# Patient Record
Sex: MALE | Race: BLACK OR AFRICAN AMERICAN | Employment: OTHER | URBAN - METROPOLITAN AREA
[De-identification: names, ages, dates, MRNs, and addresses within clinical notes are randomized per-mention and may not be internally consistent; named-entity substitution may affect disease eponyms.]

---

## 2018-04-27 ENCOUNTER — OFFICE VISIT (OUTPATIENT)
Dept: RHEUMATOLOGY | Age: 58
End: 2018-04-27

## 2018-04-27 ENCOUNTER — TELEPHONE (OUTPATIENT)
Dept: DERMATOLOGY | Age: 58
End: 2018-04-27

## 2018-04-27 VITALS
HEART RATE: 74 BPM | DIASTOLIC BLOOD PRESSURE: 86 MMHG | BODY MASS INDEX: 26.13 KG/M2 | HEIGHT: 68 IN | WEIGHT: 172.4 LBS | SYSTOLIC BLOOD PRESSURE: 156 MMHG

## 2018-04-27 DIAGNOSIS — L30.9 DERMATITIS: Primary | ICD-10-CM

## 2018-04-27 DIAGNOSIS — L30.9 DERMATITIS: ICD-10-CM

## 2018-04-27 LAB
BASOPHILS ABSOLUTE: 0 K/UL (ref 0–0.2)
BASOPHILS RELATIVE PERCENT: 1.6 %
BILIRUBIN URINE: NEGATIVE
BLOOD, URINE: NEGATIVE
CLARITY: CLEAR
COLOR: YELLOW
CREAT SERPL-MCNC: 1.3 MG/DL (ref 0.9–1.3)
EOSINOPHILS ABSOLUTE: 0.1 K/UL (ref 0–0.6)
EOSINOPHILS RELATIVE PERCENT: 1.9 %
EPITHELIAL CELLS, UA: 0 /HPF (ref 0–5)
GFR AFRICAN AMERICAN: >60
GFR NON-AFRICAN AMERICAN: 57
GLUCOSE URINE: NEGATIVE MG/DL
HCT VFR BLD CALC: 30.3 % (ref 40.5–52.5)
HEMOGLOBIN: 10.3 G/DL (ref 13.5–17.5)
HYALINE CASTS: 3 /LPF (ref 0–8)
KETONES, URINE: NEGATIVE MG/DL
LEUKOCYTE ESTERASE, URINE: NEGATIVE
LYMPHOCYTES ABSOLUTE: 0.8 K/UL (ref 1–5.1)
LYMPHOCYTES RELATIVE PERCENT: 27.2 %
MCH RBC QN AUTO: 35.7 PG (ref 26–34)
MCHC RBC AUTO-ENTMCNC: 33.9 G/DL (ref 31–36)
MCV RBC AUTO: 105.1 FL (ref 80–100)
MICROSCOPIC EXAMINATION: YES
MONOCYTES ABSOLUTE: 0.5 K/UL (ref 0–1.3)
MONOCYTES RELATIVE PERCENT: 14.9 %
NEUTROPHILS ABSOLUTE: 1.7 K/UL (ref 1.7–7.7)
NEUTROPHILS RELATIVE PERCENT: 54.4 %
NITRITE, URINE: NEGATIVE
PDW BLD-RTO: 14.9 % (ref 12.4–15.4)
PH UA: 6
PLATELET # BLD: 165 K/UL (ref 135–450)
PMV BLD AUTO: 8.7 FL (ref 5–10.5)
PROTEIN UA: >=300 MG/DL
RBC # BLD: 2.89 M/UL (ref 4.2–5.9)
RBC UA: 4 /HPF (ref 0–4)
SPECIFIC GRAVITY UA: 1.02
URINE TYPE: ABNORMAL
UROBILINOGEN, URINE: 1 E.U./DL
WBC # BLD: 3.1 K/UL (ref 4–11)
WBC UA: 2 /HPF (ref 0–5)

## 2018-04-27 PROCEDURE — G8419 CALC BMI OUT NRM PARAM NOF/U: HCPCS | Performed by: INTERNAL MEDICINE

## 2018-04-27 PROCEDURE — G8427 DOCREV CUR MEDS BY ELIG CLIN: HCPCS | Performed by: INTERNAL MEDICINE

## 2018-04-27 PROCEDURE — 99204 OFFICE O/P NEW MOD 45 MIN: CPT | Performed by: INTERNAL MEDICINE

## 2018-04-27 PROCEDURE — 4004F PT TOBACCO SCREEN RCVD TLK: CPT | Performed by: INTERNAL MEDICINE

## 2018-04-27 PROCEDURE — 3017F COLORECTAL CA SCREEN DOC REV: CPT | Performed by: INTERNAL MEDICINE

## 2018-04-27 RX ORDER — PREDNISONE 20 MG/1
TABLET ORAL
Qty: 90 TABLET | Refills: 0 | Status: SHIPPED | OUTPATIENT
Start: 2018-04-27 | End: 2018-06-04 | Stop reason: SDUPTHER

## 2018-04-30 ENCOUNTER — TELEPHONE (OUTPATIENT)
Dept: RHEUMATOLOGY | Age: 58
End: 2018-04-30

## 2018-04-30 DIAGNOSIS — R80.9 PROTEINURIA, UNSPECIFIED TYPE: Primary | ICD-10-CM

## 2018-04-30 LAB
DOUBLE STRANDED DNA AB, IGG: DETECTED
ENA TO RNP ANTIBODY: POSITIVE EU
ENA TO SMITH (SM) ANTIBODY: POSITIVE EU
ENA TO SSA (RO) ANTIBODY: POSITIVE EU
ENA TO SSB (LA) ANTIBODY: NEGATIVE EU

## 2018-04-30 RX ORDER — HYDROXYCHLOROQUINE SULFATE 200 MG/1
400 TABLET, FILM COATED ORAL DAILY
Qty: 60 TABLET | Refills: 2 | Status: SHIPPED | OUTPATIENT
Start: 2018-04-30 | End: 2018-06-04 | Stop reason: SDUPTHER

## 2018-05-01 LAB — DSDNA ANTIBODY TITER: ABNORMAL

## 2018-05-02 LAB
ANA INTERPRETATION: ABNORMAL
ANA PATTERN: ABNORMAL
ANA TITER: ABNORMAL
ANTI-NUCLEAR ANTIBODY (ANA): POSITIVE
PATHOLOGIST: ABNORMAL

## 2018-05-03 LAB
24HR URINE VOLUME (ML): 3200 ML
CREATININE 24 HOUR URINE: 1.9 G/24HR (ref 0.6–2.5)
PROTEIN 24 HOUR URINE: 5.18 G/24HR

## 2018-05-22 ENCOUNTER — TELEPHONE (OUTPATIENT)
Dept: INTERNAL MEDICINE CLINIC | Age: 58
End: 2018-05-22

## 2018-06-04 ENCOUNTER — OFFICE VISIT (OUTPATIENT)
Dept: RHEUMATOLOGY | Age: 58
End: 2018-06-04

## 2018-06-04 VITALS
DIASTOLIC BLOOD PRESSURE: 80 MMHG | WEIGHT: 165 LBS | HEIGHT: 68 IN | HEART RATE: 80 BPM | SYSTOLIC BLOOD PRESSURE: 152 MMHG | BODY MASS INDEX: 25.01 KG/M2

## 2018-06-04 DIAGNOSIS — M32.14 OTHER SYSTEMIC LUPUS ERYTHEMATOSUS WITH GLOMERULAR DISEASE (HCC): Primary | ICD-10-CM

## 2018-06-04 PROCEDURE — 99213 OFFICE O/P EST LOW 20 MIN: CPT | Performed by: INTERNAL MEDICINE

## 2018-06-04 PROCEDURE — 4004F PT TOBACCO SCREEN RCVD TLK: CPT | Performed by: INTERNAL MEDICINE

## 2018-06-04 PROCEDURE — G8419 CALC BMI OUT NRM PARAM NOF/U: HCPCS | Performed by: INTERNAL MEDICINE

## 2018-06-04 PROCEDURE — 3017F COLORECTAL CA SCREEN DOC REV: CPT | Performed by: INTERNAL MEDICINE

## 2018-06-04 PROCEDURE — G8427 DOCREV CUR MEDS BY ELIG CLIN: HCPCS | Performed by: INTERNAL MEDICINE

## 2018-06-04 RX ORDER — TORSEMIDE 20 MG/1
20 TABLET ORAL DAILY
COMMUNITY
End: 2019-01-31 | Stop reason: ALTCHOICE

## 2018-06-04 RX ORDER — PEN NEEDLE, DIABETIC 31 GX5/16"
1 NEEDLE, DISPOSABLE MISCELLANEOUS DAILY
COMMUNITY
End: 2020-05-29 | Stop reason: SDUPTHER

## 2018-06-04 RX ORDER — HYDROXYCHLOROQUINE SULFATE 200 MG/1
400 TABLET, FILM COATED ORAL DAILY
Qty: 60 TABLET | Refills: 2 | Status: SHIPPED | OUTPATIENT
Start: 2018-06-04 | End: 2018-09-25 | Stop reason: SDUPTHER

## 2018-06-04 RX ORDER — PREDNISONE 20 MG/1
20 TABLET ORAL 2 TIMES DAILY
Qty: 60 TABLET | Refills: 2 | Status: SHIPPED | OUTPATIENT
Start: 2018-06-04 | End: 2019-09-26

## 2018-06-04 RX ORDER — LANCETS 30 GAUGE
1 EACH MISCELLANEOUS DAILY
COMMUNITY
End: 2022-05-09

## 2018-06-04 RX ORDER — CARVEDILOL 12.5 MG/1
12.5 TABLET ORAL 2 TIMES DAILY WITH MEALS
COMMUNITY
End: 2018-07-12 | Stop reason: SDUPTHER

## 2018-06-04 RX ORDER — INSULIN GLARGINE 100 [IU]/ML
24 INJECTION, SOLUTION SUBCUTANEOUS EVERY MORNING
COMMUNITY
End: 2019-05-08

## 2018-06-15 ENCOUNTER — OFFICE VISIT (OUTPATIENT)
Dept: INTERNAL MEDICINE CLINIC | Age: 58
End: 2018-06-15

## 2018-06-15 VITALS
BODY MASS INDEX: 25.31 KG/M2 | HEART RATE: 80 BPM | DIASTOLIC BLOOD PRESSURE: 80 MMHG | HEIGHT: 68 IN | SYSTOLIC BLOOD PRESSURE: 142 MMHG | WEIGHT: 167 LBS

## 2018-06-15 DIAGNOSIS — E09.9 STEROID-INDUCED DIABETES (HCC): ICD-10-CM

## 2018-06-15 DIAGNOSIS — I42.0 DILATED CARDIOMYOPATHY (HCC): ICD-10-CM

## 2018-06-15 DIAGNOSIS — R80.9 NEPHROTIC RANGE PROTEINURIA: ICD-10-CM

## 2018-06-15 DIAGNOSIS — E11.65 TYPE 2 DIABETES MELLITUS WITH HYPERGLYCEMIA, WITH LONG-TERM CURRENT USE OF INSULIN (HCC): ICD-10-CM

## 2018-06-15 DIAGNOSIS — L80 VITILIGO: ICD-10-CM

## 2018-06-15 DIAGNOSIS — Z79.4 TYPE 2 DIABETES MELLITUS WITH HYPERGLYCEMIA, WITH LONG-TERM CURRENT USE OF INSULIN (HCC): ICD-10-CM

## 2018-06-15 DIAGNOSIS — Z12.5 PROSTATE CANCER SCREENING: Primary | ICD-10-CM

## 2018-06-15 DIAGNOSIS — M32.14 LUPUS NEPHRITIS (HCC): ICD-10-CM

## 2018-06-15 DIAGNOSIS — M32.19 DILATED CARDIOMYOPATHY SECONDARY TO SYSTEMIC LUPUS ERYTHEMATOSUS (HCC): ICD-10-CM

## 2018-06-15 DIAGNOSIS — T38.0X5A STEROID-INDUCED DIABETES (HCC): ICD-10-CM

## 2018-06-15 DIAGNOSIS — I43 DILATED CARDIOMYOPATHY SECONDARY TO SYSTEMIC LUPUS ERYTHEMATOSUS (HCC): ICD-10-CM

## 2018-06-15 DIAGNOSIS — I15.9 SECONDARY HYPERTENSION: ICD-10-CM

## 2018-06-15 PROBLEM — I42.9 CARDIOMYOPATHY (HCC): Status: ACTIVE | Noted: 2018-01-01

## 2018-06-15 PROCEDURE — G8427 DOCREV CUR MEDS BY ELIG CLIN: HCPCS | Performed by: INTERNAL MEDICINE

## 2018-06-15 PROCEDURE — 99205 OFFICE O/P NEW HI 60 MIN: CPT | Performed by: INTERNAL MEDICINE

## 2018-06-15 PROCEDURE — 3017F COLORECTAL CA SCREEN DOC REV: CPT | Performed by: INTERNAL MEDICINE

## 2018-06-15 PROCEDURE — 3046F HEMOGLOBIN A1C LEVEL >9.0%: CPT | Performed by: INTERNAL MEDICINE

## 2018-06-15 PROCEDURE — 82962 GLUCOSE BLOOD TEST: CPT | Performed by: INTERNAL MEDICINE

## 2018-06-15 PROCEDURE — 2022F DILAT RTA XM EVC RTNOPTHY: CPT | Performed by: INTERNAL MEDICINE

## 2018-06-15 PROCEDURE — G8419 CALC BMI OUT NRM PARAM NOF/U: HCPCS | Performed by: INTERNAL MEDICINE

## 2018-06-15 PROCEDURE — 1036F TOBACCO NON-USER: CPT | Performed by: INTERNAL MEDICINE

## 2018-06-15 RX ORDER — LOSARTAN POTASSIUM 100 MG/1
50 TABLET ORAL DAILY
COMMUNITY
Start: 2018-05-11 | End: 2018-07-18 | Stop reason: ALTCHOICE

## 2018-06-15 ASSESSMENT — PATIENT HEALTH QUESTIONNAIRE - PHQ9
2. FEELING DOWN, DEPRESSED OR HOPELESS: 0
SUM OF ALL RESPONSES TO PHQ9 QUESTIONS 1 & 2: 0
1. LITTLE INTEREST OR PLEASURE IN DOING THINGS: 0
SUM OF ALL RESPONSES TO PHQ QUESTIONS 1-9: 0

## 2018-06-15 ASSESSMENT — ENCOUNTER SYMPTOMS
DIARRHEA: 0
NAUSEA: 0
VOMITING: 0
ORTHOPNEA: 0
BLOOD IN STOOL: 0
ABDOMINAL PAIN: 0
SHORTNESS OF BREATH: 0
CONSTIPATION: 0
WHEEZING: 0
HEARTBURN: 0
COUGH: 0

## 2018-07-12 ENCOUNTER — OFFICE VISIT (OUTPATIENT)
Dept: CARDIOLOGY CLINIC | Age: 58
End: 2018-07-12

## 2018-07-12 VITALS
WEIGHT: 182.1 LBS | DIASTOLIC BLOOD PRESSURE: 96 MMHG | HEART RATE: 71 BPM | SYSTOLIC BLOOD PRESSURE: 164 MMHG | BODY MASS INDEX: 28.1 KG/M2

## 2018-07-12 DIAGNOSIS — R06.02 SOB (SHORTNESS OF BREATH): Primary | ICD-10-CM

## 2018-07-12 DIAGNOSIS — I42.9 CARDIOMYOPATHY, UNSPECIFIED TYPE (HCC): ICD-10-CM

## 2018-07-12 PROCEDURE — 3017F COLORECTAL CA SCREEN DOC REV: CPT | Performed by: INTERNAL MEDICINE

## 2018-07-12 PROCEDURE — 99204 OFFICE O/P NEW MOD 45 MIN: CPT | Performed by: INTERNAL MEDICINE

## 2018-07-12 PROCEDURE — G8419 CALC BMI OUT NRM PARAM NOF/U: HCPCS | Performed by: INTERNAL MEDICINE

## 2018-07-12 PROCEDURE — G8427 DOCREV CUR MEDS BY ELIG CLIN: HCPCS | Performed by: INTERNAL MEDICINE

## 2018-07-12 RX ORDER — MYCOPHENOLATE MOFETIL 500 MG/1
500 TABLET ORAL 2 TIMES DAILY
COMMUNITY
End: 2019-07-22 | Stop reason: SDUPTHER

## 2018-07-12 RX ORDER — SULFAMETHOXAZOLE AND TRIMETHOPRIM 800; 160 MG/1; MG/1
1 TABLET ORAL ONCE
COMMUNITY
End: 2019-03-25

## 2018-07-12 RX ORDER — CARVEDILOL 12.5 MG/1
12.5 TABLET ORAL 2 TIMES DAILY WITH MEALS
Qty: 60 TABLET | Refills: 1 | Status: SHIPPED | OUTPATIENT
Start: 2018-07-12 | End: 2019-01-31 | Stop reason: ALTCHOICE

## 2018-07-12 RX ORDER — CALCIUM CARBONATE 500(1250)
500 TABLET ORAL 3 TIMES DAILY
COMMUNITY

## 2018-07-12 RX ORDER — OMEPRAZOLE 20 MG/1
20 CAPSULE, DELAYED RELEASE ORAL DAILY
COMMUNITY
End: 2019-05-08 | Stop reason: ALTCHOICE

## 2018-07-12 NOTE — PROGRESS NOTES
[facility] [place] [id number]     Outpatient Cardiology Consult  Consulting Cardiologist García Kingsley M.D., [facility] [place]  Referring Provider:  Frida Abdullahi MD    7/12/2018,9:58 AM    Chief Complaint   Patient presents with    New Patient    Results     hospital encounter 5/2018, Pt. with history of syncope, SOB with exercise.  Shortness of Breath           Asked by No admitting provider for patient encounter./Ramesh Mo MD  to evaluate and assess this patient's Cardiomyopathy    History of Present Illness: Lolly Magaña is a 61 y.o. male here for cardiac evaluation and establishment of cardiac care. He was recently diagnosed with significant cutaneous problems as having lupus and is seeing Dr. Ever Navarrete for rheumatology. Also sees Dr. Kelly Stephenson as his primary care doctor. Apparently while visiting at a hospital in [place] he did have workup including an echocardiogram showed cardiomyopathy with ejection fraction of 20%. Additionally showed moderate to severe mitral valve insufficiency. He denies chest pains denies shortness of breath and actually has surprisingly good energy. He golfs and has no limitations in fact walking 9 holes yesterday. Denies any PND orthopnea or pedal edema. Socially he did smoke but quit about 3 months ago. He did recently retired as up [employer]. Apparently losartan at been used for blood pressure and did cause him to have edema. He is on chronic dose of prednisone at 20 mg for his lupus. His examination today is unremarkable except for blood pressure 164/96. The heart rate is 71 and he does not have any peripheral edema and the lungs are clear. Past Medical History:   has a past medical history of Cardiomyopathy ([address] Dilated cardiomyopathy secondary to systemic lupus erythematosus ([address] Lupus nephritis ([address] Nephrotic range proteinuria; Secondary hypertension; Steroid-induced diabetes ([address]  Systemic lupus ([address] Type 2 diabetes mellitus ([address] and Vitiligo. Surgical History:   has no past surgical history on file. Social History:   reports that he has quit smoking. His smoking use included Cigarettes. He has a 1.00 pack-year smoking history. He has never used smokeless tobacco. He reports that he does not drink alcohol or use drugs. Family History:  family history is not on file. Home Medications:  Prior to Admission medications    Medication Sig Start Date End Date Taking?  Authorizing Provider   omeprazole (PRILOSEC) 20 MG delayed release capsule Take 20 mg by mouth daily   Yes Historical Provider, MD   sulfamethoxazole-trimethoprim (BACTRIM DS;SEPTRA DS) 800-160 MG per tablet Take 1 tablet by mouth once   Yes Historical Provider, MD   mycophenolate (CELLCEPT) 500 MG tablet Take 500 mg by mouth 2 times daily   Yes Historical Provider, MD   calcium elemental (OYSCO 500) 500 MG TABS tablet Take 500 mg by mouth 3 times daily   Yes Historical Provider, MD   carvedilol (COREG) 12.5 MG tablet Take 12.5 mg by mouth 2 times daily (with meals)   Yes Historical Provider, MD   hydroxychloroquine (PLAQUENIL) 200 MG tablet Take 2 tablets by mouth daily 6/4/18  Yes Sofy Vargas MD   predniSONE (DELTASONE) 20 MG tablet Take 1 tablet by mouth 2 times daily 6/4/18  Yes Sofy Vargas MD   torsemide (DEMADEX) 20 MG tablet Take 20 mg by mouth daily   Yes Historical Provider, MD   losartan (COZAAR) 100 MG tablet Take 50 mg by mouth daily 5/11/18   Historical Provider, MD   insulin glargine (LANTUS) 100 UNIT/ML injection vial Inject 24 Units into the skin every morning     Historical Provider, MD   Lancets MISC 1 each by Does not apply route daily    Historical Provider, MD   Insulin Pen Needle (PEN NEEDLES 31GX5/16\") 31G X 8 MM MISC 1 each by Does not apply route daily    Historical Provider, MD   insulin lispro (HUMALOG) 100 UNIT/ML injection cartridge Inject 8 Units into the skin 3 times daily (before meals) size.  There is a trivial pericardial effusion. This patient was educated using the patient point room wall mount device. Absence from smokers and smoking and diet and exercising are important. Assessment/ Plan      Systemic lupus erythematosus currently being treated with prednisone  Hypertension blood pressure 164/96  Cardiomyopathy of undetermined cause. May be related to lupus. Ejection fraction 20%  Mitral valve insufficiency which is moderate to severe without symptoms and may be due to left ventricular dilatation. At this time he seems to be fairly well compensated. No signs of failure. We had long discussion about sodium restriction. We will start him on entresto at 25 mg twice a day which will help his blood pressure as well as his LV function. We'll plan an ischemia evaluation although I don't think that is the case. We had a discussion about the potential need for a defibrillator and we will follow that very closely. He is due to have a dental extraction soon by Dr. Aaron Davis fax number is [phone] I do not see any contraindications for the extraction and will follow him back in the office in about 2 months and we will follow his echo to see if his LV function improves. Please fax a copy of this note to Dr. Aaron Davis as a cardiac clearance for the dental extraction that is anticipated. Thanks for allowing us the opportunity  to participate in the evaluation and care of your patients.  Please call if we may assist further [phone]    This note was likely completed using voice recognition technology and may contain unintended errors  Luke Orozco M.D., [facility] [place]  4/52/67093:16 AM

## 2018-07-18 ENCOUNTER — OFFICE VISIT (OUTPATIENT)
Dept: INTERNAL MEDICINE CLINIC | Age: 58
End: 2018-07-18

## 2018-07-18 VITALS
DIASTOLIC BLOOD PRESSURE: 98 MMHG | HEART RATE: 86 BPM | SYSTOLIC BLOOD PRESSURE: 180 MMHG | WEIGHT: 183 LBS | HEIGHT: 68 IN | BODY MASS INDEX: 27.74 KG/M2

## 2018-07-18 DIAGNOSIS — E11.65 TYPE 2 DIABETES MELLITUS WITH HYPERGLYCEMIA, WITH LONG-TERM CURRENT USE OF INSULIN (HCC): Primary | ICD-10-CM

## 2018-07-18 DIAGNOSIS — I15.9 SECONDARY HYPERTENSION: ICD-10-CM

## 2018-07-18 DIAGNOSIS — Z79.4 TYPE 2 DIABETES MELLITUS WITH HYPERGLYCEMIA, WITH LONG-TERM CURRENT USE OF INSULIN (HCC): Primary | ICD-10-CM

## 2018-07-18 PROCEDURE — 2022F DILAT RTA XM EVC RTNOPTHY: CPT | Performed by: INTERNAL MEDICINE

## 2018-07-18 PROCEDURE — 3046F HEMOGLOBIN A1C LEVEL >9.0%: CPT | Performed by: INTERNAL MEDICINE

## 2018-07-18 PROCEDURE — 99213 OFFICE O/P EST LOW 20 MIN: CPT | Performed by: INTERNAL MEDICINE

## 2018-07-18 PROCEDURE — 1036F TOBACCO NON-USER: CPT | Performed by: INTERNAL MEDICINE

## 2018-07-18 PROCEDURE — G8427 DOCREV CUR MEDS BY ELIG CLIN: HCPCS | Performed by: INTERNAL MEDICINE

## 2018-07-18 PROCEDURE — 3017F COLORECTAL CA SCREEN DOC REV: CPT | Performed by: INTERNAL MEDICINE

## 2018-07-18 PROCEDURE — G8419 CALC BMI OUT NRM PARAM NOF/U: HCPCS | Performed by: INTERNAL MEDICINE

## 2018-07-18 NOTE — PROGRESS NOTES
Disp: 60 tablet, Rfl: 1    sacubitril-valsartan (ENTRESTO) 24-26 MG per tablet, Take 1/2 tablet bid, Disp: 60 tablet, Rfl: 6    insulin glargine (LANTUS) 100 UNIT/ML injection vial, Inject 24 Units into the skin every morning , Disp: , Rfl:     hydroxychloroquine (PLAQUENIL) 200 MG tablet, Take 2 tablets by mouth daily, Disp: 60 tablet, Rfl: 2    predniSONE (DELTASONE) 20 MG tablet, Take 1 tablet by mouth 2 times daily, Disp: 60 tablet, Rfl: 2    Lancets MISC, 1 each by Does not apply route daily, Disp: , Rfl:     torsemide (DEMADEX) 20 MG tablet, Take 20 mg by mouth daily, Disp: , Rfl:     Insulin Pen Needle (PEN NEEDLES 31GX5/16\") 31G X 8 MM MISC, 1 each by Does not apply route daily, Disp: , Rfl:     insulin lispro (HUMALOG) 100 UNIT/ML injection cartridge, Inject 8 Units into the skin 3 times daily (before meals), Disp: , Rfl:     Assessment/Plan:  Dale Cabrera was seen today for 1 month follow-up. Diagnoses and all orders for this visit:    Type 2 diabetes mellitus with hyperglycemia, with long-term current use of insulin ([address]    Secondary hypertension      Patients last HbA1c in 5/2018 was 10.3. I will discuss his case with the cardiologist regarding his persistent elevated blood pressure and whether or not the Entresto is appropriately dosed. The patient is only taking it once a day. He only takes one half tablet daily.     Stephen Rosenthal MD

## 2018-07-19 ENCOUNTER — OFFICE VISIT (OUTPATIENT)
Dept: RHEUMATOLOGY | Age: 58
End: 2018-07-19

## 2018-07-19 VITALS
HEART RATE: 80 BPM | BODY MASS INDEX: 28.27 KG/M2 | DIASTOLIC BLOOD PRESSURE: 86 MMHG | SYSTOLIC BLOOD PRESSURE: 160 MMHG | WEIGHT: 183.2 LBS

## 2018-07-19 DIAGNOSIS — M32.14 OTHER SYSTEMIC LUPUS ERYTHEMATOSUS WITH GLOMERULAR DISEASE (HCC): Primary | ICD-10-CM

## 2018-07-19 DIAGNOSIS — M32.14 LUPUS NEPHRITIS (HCC): ICD-10-CM

## 2018-07-19 PROCEDURE — G8427 DOCREV CUR MEDS BY ELIG CLIN: HCPCS | Performed by: INTERNAL MEDICINE

## 2018-07-19 PROCEDURE — 1036F TOBACCO NON-USER: CPT | Performed by: INTERNAL MEDICINE

## 2018-07-19 PROCEDURE — 3017F COLORECTAL CA SCREEN DOC REV: CPT | Performed by: INTERNAL MEDICINE

## 2018-07-19 PROCEDURE — 99214 OFFICE O/P EST MOD 30 MIN: CPT | Performed by: INTERNAL MEDICINE

## 2018-07-19 PROCEDURE — G8419 CALC BMI OUT NRM PARAM NOF/U: HCPCS | Performed by: INTERNAL MEDICINE

## 2018-07-20 LAB
C3 COMPLEMENT: 90 MG/DL (ref 87–200)
C4 COMPLEMENT: 15 MG/DL (ref 19–52)

## 2018-08-07 ENCOUNTER — TELEPHONE (OUTPATIENT)
Dept: INTERNAL MEDICINE CLINIC | Age: 58
End: 2018-08-07

## 2018-09-25 RX ORDER — HYDROXYCHLOROQUINE SULFATE 200 MG/1
400 TABLET, FILM COATED ORAL DAILY
Qty: 60 TABLET | Refills: 1 | Status: SHIPPED | OUTPATIENT
Start: 2018-09-25 | End: 2018-11-27 | Stop reason: SDUPTHER

## 2018-09-27 ENCOUNTER — OFFICE VISIT (OUTPATIENT)
Dept: CARDIOLOGY CLINIC | Age: 58
End: 2018-09-27
Payer: COMMERCIAL

## 2018-09-27 VITALS
DIASTOLIC BLOOD PRESSURE: 80 MMHG | WEIGHT: 189.4 LBS | BODY MASS INDEX: 28.7 KG/M2 | SYSTOLIC BLOOD PRESSURE: 170 MMHG | OXYGEN SATURATION: 98 % | HEART RATE: 80 BPM | HEIGHT: 68 IN

## 2018-09-27 DIAGNOSIS — I42.9 CARDIOMYOPATHY, UNSPECIFIED TYPE (HCC): Primary | ICD-10-CM

## 2018-09-27 DIAGNOSIS — I10 ESSENTIAL HYPERTENSION: ICD-10-CM

## 2018-09-27 PROCEDURE — 3017F COLORECTAL CA SCREEN DOC REV: CPT | Performed by: INTERNAL MEDICINE

## 2018-09-27 PROCEDURE — G8427 DOCREV CUR MEDS BY ELIG CLIN: HCPCS | Performed by: INTERNAL MEDICINE

## 2018-09-27 PROCEDURE — G8419 CALC BMI OUT NRM PARAM NOF/U: HCPCS | Performed by: INTERNAL MEDICINE

## 2018-09-27 PROCEDURE — 1036F TOBACCO NON-USER: CPT | Performed by: INTERNAL MEDICINE

## 2018-09-27 PROCEDURE — 99214 OFFICE O/P EST MOD 30 MIN: CPT | Performed by: INTERNAL MEDICINE

## 2018-09-27 NOTE — PROGRESS NOTES
(with meals) 60 tablet 1    insulin glargine (LANTUS) 100 UNIT/ML injection vial Inject 24 Units into the skin every morning       predniSONE (DELTASONE) 20 MG tablet Take 1 tablet by mouth 2 times daily (Patient taking differently: Take 10 mg by mouth 2 times daily ) 60 tablet 2    Lancets MISC 1 each by Does not apply route daily      Insulin Pen Needle (PEN NEEDLES 31GX5/16\") 31G X 8 MM MISC 1 each by Does not apply route daily      insulin lispro (HUMALOG) 100 UNIT/ML injection cartridge Inject 8 Units into the skin 3 times daily (before meals)      sulfamethoxazole-trimethoprim (BACTRIM DS;SEPTRA DS) 800-160 MG per tablet Take 1 tablet by mouth once      sacubitril-valsartan (ENTRESTO) 24-26 MG per tablet Take 1/2 tablet bid 60 tablet 6    torsemide (DEMADEX) 20 MG tablet Take 20 mg by mouth daily       No current facility-administered medications for this visit. REVIEW OF SYSTEMS:    CONSTITUTIONAL: No major weight gain or loss, fatigue, weakness, night sweats or fever. HEENT: No new vision difficulties or ringing in the ears. RESPIRATORY: No new SOB, PND, orthopnea or cough. CARDIOVASCULAR: See HPI  GI: No nausea, vomiting, diarrhea, constipation, abdominal pain or changes in bowel habits. : No urinary frequency, urgency, incontinence hematuria or dysuria. SKIN: No cyanosis or skin lesions. MUSCULOSKELETAL: No new muscle or joint pain. NEUROLOGICAL: No syncope or TIA-like symptoms.   PSYCHIATRIC: No anxiety, pain, insomnia or depression    Objective:   PHYSICAL EXAM:        VITALS:    Wt Readings from Last 3 Encounters:   09/27/18 189 lb 6.4 oz (85.9 kg)   07/19/18 183 lb 3.2 oz (83.1 kg)   07/18/18 183 lb (83 kg)     BP Readings from Last 3 Encounters:   09/27/18 (!) 170/80   07/19/18 (!) 160/86   07/18/18 (!) 180/98     Pulse Readings from Last 3 Encounters:   09/27/18 80   07/19/18 80   07/18/18 86       CONSTITUTIONAL: Cooperative, no apparent distress, and appears well nourished / developed  NEUROLOGIC:  Awake and orientated to person, place and time. PSYCH: Calm affect. SKIN: Warm and dry. HEENT: Sclera non-icteric, normocephalic, neck supple, no elevation of JVP, normal carotid pulses with no bruits and thyroid normal size. LUNGS:  No increased work of breathing and clear to auscultation, no crackles or wheezing  CARDIOVASCULAR:  Regular rate and rhythm with no murmurs, gallops, rubs, or abnormal heart sounds, normal PMI. The apical impulses not displaced  Heart tones are crisp and normal  Cervical veins are not engorged  The carotid upstroke is normal in amplitude and contour without delay or bruit  JVP is not elevated  ABDOMEN:  Normal bowel sounds, non-distended and non-tender to palpation  EXT: No edema, no calf tenderness. Pulses are present bilaterally. DATA:    Lab Results   Component Value Date    ALT 54 (H) 10/05/2011    AST 41 (H) 10/05/2011    ALKPHOS 74 10/05/2011    BILITOT 0.50 10/05/2011     Lab Results   Component Value Date    CREATININE 1.58 (H) 09/09/2018    BUN 32 (H) 09/09/2018     (L) 09/09/2018    K 3.7 09/09/2018    CL 99 09/09/2018    CO2 25 09/09/2018     Lab Results   Component Value Date    TSH 1.45 10/05/2011    Y9WOGOW 6.3 10/05/2011     Lab Results   Component Value Date    WBC 7.2 09/09/2018    HGB 11.7 (L) 09/09/2018    HCT 33.7 (L) 09/09/2018    MCV 96.0 09/09/2018     09/09/2018     No components found for: CHLPL  Lab Results   Component Value Date    TRIG 229 (H) 10/05/2011     Lab Results   Component Value Date    HDL 42 10/05/2011     Lab Results   Component Value Date    LDLCALC 119 (H) 10/05/2011     Lab Results   Component Value Date    LABVLDL 46 10/05/2011     Radiology Review:  Pertinent images / reports were reviewed as a part of this visit and reveals the following:    Last Echo:  Study Conclusions July 26, 2018     - Left ventricle: The cavity size was normal. Wall thickness was    increased in a pattern of moderate LVH.

## 2018-10-22 ENCOUNTER — OFFICE VISIT (OUTPATIENT)
Dept: INTERNAL MEDICINE CLINIC | Age: 58
End: 2018-10-22
Payer: COMMERCIAL

## 2018-10-22 VITALS
HEART RATE: 76 BPM | DIASTOLIC BLOOD PRESSURE: 88 MMHG | HEIGHT: 68 IN | WEIGHT: 191 LBS | BODY MASS INDEX: 28.95 KG/M2 | SYSTOLIC BLOOD PRESSURE: 156 MMHG

## 2018-10-22 DIAGNOSIS — T38.0X5A STEROID-INDUCED DIABETES (HCC): ICD-10-CM

## 2018-10-22 DIAGNOSIS — Z23 NEED FOR PNEUMOCOCCAL VACCINATION: ICD-10-CM

## 2018-10-22 DIAGNOSIS — E09.9 STEROID-INDUCED DIABETES (HCC): ICD-10-CM

## 2018-10-22 DIAGNOSIS — Z23 NEED FOR INFLUENZA VACCINATION: Primary | ICD-10-CM

## 2018-10-22 DIAGNOSIS — I15.9 SECONDARY HYPERTENSION: ICD-10-CM

## 2018-10-22 DIAGNOSIS — Z23 NEED FOR TDAP VACCINATION: ICD-10-CM

## 2018-10-22 LAB
CHOLESTEROL, TOTAL: 198 MG/DL (ref 0–199)
HDLC SERPL-MCNC: 48 MG/DL (ref 40–60)
LDL CHOLESTEROL CALCULATED: 130 MG/DL
TRIGL SERPL-MCNC: 101 MG/DL (ref 0–150)
VLDLC SERPL CALC-MCNC: 20 MG/DL

## 2018-10-22 PROCEDURE — G8427 DOCREV CUR MEDS BY ELIG CLIN: HCPCS | Performed by: INTERNAL MEDICINE

## 2018-10-22 PROCEDURE — 1036F TOBACCO NON-USER: CPT | Performed by: INTERNAL MEDICINE

## 2018-10-22 PROCEDURE — 90715 TDAP VACCINE 7 YRS/> IM: CPT | Performed by: INTERNAL MEDICINE

## 2018-10-22 PROCEDURE — 90471 IMMUNIZATION ADMIN: CPT | Performed by: INTERNAL MEDICINE

## 2018-10-22 PROCEDURE — G8482 FLU IMMUNIZE ORDER/ADMIN: HCPCS | Performed by: INTERNAL MEDICINE

## 2018-10-22 PROCEDURE — 90732 PPSV23 VACC 2 YRS+ SUBQ/IM: CPT | Performed by: INTERNAL MEDICINE

## 2018-10-22 PROCEDURE — 90688 IIV4 VACCINE SPLT 0.5 ML IM: CPT | Performed by: INTERNAL MEDICINE

## 2018-10-22 PROCEDURE — G8419 CALC BMI OUT NRM PARAM NOF/U: HCPCS | Performed by: INTERNAL MEDICINE

## 2018-10-22 PROCEDURE — 90472 IMMUNIZATION ADMIN EACH ADD: CPT | Performed by: INTERNAL MEDICINE

## 2018-10-22 PROCEDURE — 3017F COLORECTAL CA SCREEN DOC REV: CPT | Performed by: INTERNAL MEDICINE

## 2018-10-22 PROCEDURE — 99213 OFFICE O/P EST LOW 20 MIN: CPT | Performed by: INTERNAL MEDICINE

## 2018-10-22 NOTE — PROGRESS NOTES
SUBJECTIVE:  Patient ID:Gianni Ambrocio is a 60 y.o. y.o. male     HPI    Roxie Ross returns for follow up of hypertension. Patient has been taking His medications as prescribed. Patient's blood pressureis  controlled. Side effects related to taking the medications include no medication side effects noted    Patient returns for follow up of hyperlipidemia. Patient has not been taking His medications as prescribed. Patient's lipids are controlled. Side effects related to taking the medications include none. He has had clinical consequences related to hyperlipidemia including coronary artery disease. The patient has been following up with cardiology for management of his blood pressure but his blood pressure remains elevated. There is some question as to whether or not the cardiologist was going to talk to the nephrologist about putting the patient on a higher dose of Entresto. I am not sure if that happened or not. Review of Systems    OBJECTIVE:    BP (!) 156/88   Pulse 76   Ht 5' 8\" (1.727 m)   Wt 191 lb (86.6 kg)   BMI 29.04 kg/m²    Physical Exam   Constitutional: He is oriented to person, place, and time. He appears well-developed and well-nourished. No distress. HENT:   Head: Normocephalic and atraumatic. Pulmonary/Chest: Effort normal.   Neurological: He is alert and oriented to person, place, and time. No cranial nerve deficit. Skin: He is not diaphoretic. Psychiatric: He has a normal mood and affect. His behavior is normal. Judgment and thought content normal.   Vitals reviewed.        Current Outpatient Prescriptions:     hydroxychloroquine (PLAQUENIL) 200 MG tablet, Take 2 tablets by mouth daily, Disp: 60 tablet, Rfl: 1    omeprazole (PRILOSEC) 20 MG delayed release capsule, Take 20 mg by mouth daily, Disp: , Rfl:     sulfamethoxazole-trimethoprim (BACTRIM DS;SEPTRA DS) 800-160 MG per tablet, Take 1 tablet by mouth once, Disp: , Rfl:     mycophenolate (CELLCEPT) 500 MG tablet, Take

## 2018-10-23 LAB
ESTIMATED AVERAGE GLUCOSE: 159.9 MG/DL
HBA1C MFR BLD: 7.2 %

## 2018-10-25 ENCOUNTER — OFFICE VISIT (OUTPATIENT)
Dept: RHEUMATOLOGY | Age: 58
End: 2018-10-25
Payer: COMMERCIAL

## 2018-10-25 VITALS
BODY MASS INDEX: 29.31 KG/M2 | WEIGHT: 193.4 LBS | DIASTOLIC BLOOD PRESSURE: 96 MMHG | SYSTOLIC BLOOD PRESSURE: 206 MMHG | HEART RATE: 86 BPM | HEIGHT: 68 IN

## 2018-10-25 DIAGNOSIS — M32.14 LUPUS NEPHRITIS (HCC): ICD-10-CM

## 2018-10-25 DIAGNOSIS — M32.14 LUPUS NEPHRITIS (HCC): Primary | ICD-10-CM

## 2018-10-25 LAB
CREATININE URINE: 102.4 MG/DL (ref 39–259)
PROTEIN PROTEIN: 291 MG/DL

## 2018-10-25 PROCEDURE — G8419 CALC BMI OUT NRM PARAM NOF/U: HCPCS | Performed by: INTERNAL MEDICINE

## 2018-10-25 PROCEDURE — G8428 CUR MEDS NOT DOCUMENT: HCPCS | Performed by: INTERNAL MEDICINE

## 2018-10-25 PROCEDURE — 3017F COLORECTAL CA SCREEN DOC REV: CPT | Performed by: INTERNAL MEDICINE

## 2018-10-25 PROCEDURE — G8482 FLU IMMUNIZE ORDER/ADMIN: HCPCS | Performed by: INTERNAL MEDICINE

## 2018-10-25 PROCEDURE — 1036F TOBACCO NON-USER: CPT | Performed by: INTERNAL MEDICINE

## 2018-10-25 PROCEDURE — 99213 OFFICE O/P EST LOW 20 MIN: CPT | Performed by: INTERNAL MEDICINE

## 2018-10-26 LAB
C3 COMPLEMENT: 103.2 MG/DL (ref 90–180)
C4 COMPLEMENT: 18.5 MG/DL (ref 10–40)

## 2018-10-27 LAB — DOUBLE STRANDED DNA AB, IGG: NORMAL

## 2018-11-27 RX ORDER — HYDROXYCHLOROQUINE SULFATE 200 MG/1
400 TABLET, FILM COATED ORAL DAILY
Qty: 180 TABLET | Refills: 0 | Status: SHIPPED | OUTPATIENT
Start: 2018-11-27 | End: 2019-01-31 | Stop reason: SDUPTHER

## 2019-01-31 ENCOUNTER — OFFICE VISIT (OUTPATIENT)
Dept: RHEUMATOLOGY | Age: 59
End: 2019-01-31
Payer: COMMERCIAL

## 2019-01-31 VITALS
HEART RATE: 82 BPM | WEIGHT: 203 LBS | BODY MASS INDEX: 30.87 KG/M2 | DIASTOLIC BLOOD PRESSURE: 88 MMHG | SYSTOLIC BLOOD PRESSURE: 146 MMHG

## 2019-01-31 DIAGNOSIS — M32.14 SYSTEMIC LUPUS ERYTHEMATOSUS WITH GLOMERULAR DISEASE, UNSPECIFIED SLE TYPE (HCC): Primary | ICD-10-CM

## 2019-01-31 DIAGNOSIS — Z79.899 ENCOUNTER FOR LONG-TERM (CURRENT) USE OF MEDICATIONS: ICD-10-CM

## 2019-01-31 PROCEDURE — 99213 OFFICE O/P EST LOW 20 MIN: CPT | Performed by: INTERNAL MEDICINE

## 2019-01-31 PROCEDURE — 3017F COLORECTAL CA SCREEN DOC REV: CPT | Performed by: INTERNAL MEDICINE

## 2019-01-31 PROCEDURE — G8428 CUR MEDS NOT DOCUMENT: HCPCS | Performed by: INTERNAL MEDICINE

## 2019-01-31 PROCEDURE — G8417 CALC BMI ABV UP PARAM F/U: HCPCS | Performed by: INTERNAL MEDICINE

## 2019-01-31 PROCEDURE — G8482 FLU IMMUNIZE ORDER/ADMIN: HCPCS | Performed by: INTERNAL MEDICINE

## 2019-01-31 PROCEDURE — 1036F TOBACCO NON-USER: CPT | Performed by: INTERNAL MEDICINE

## 2019-01-31 RX ORDER — HYDROXYCHLOROQUINE SULFATE 200 MG/1
400 TABLET, FILM COATED ORAL DAILY
Qty: 180 TABLET | Refills: 0 | Status: SHIPPED | OUTPATIENT
Start: 2019-01-31 | End: 2019-05-23 | Stop reason: SDUPTHER

## 2019-02-04 ENCOUNTER — TELEPHONE (OUTPATIENT)
Dept: INTERNAL MEDICINE CLINIC | Age: 59
End: 2019-02-04

## 2019-02-04 LAB
BASOPHILS ABSOLUTE: 29 /UL (ref 0–200)
BASOPHILS RELATIVE PERCENT: 0.7 % (ref 0–1)
C3 COMPLEMENT: 111 MG/DL (ref 87–200)
C4 COMPLEMENT: 25 MG/DL (ref 19–52)
EOSINOPHILS ABSOLUTE: 21 /UL (ref 15–500)
EOSINOPHILS RELATIVE PERCENT: 0.5 % (ref 0–8)
LYMPHOCYTES ABSOLUTE: 1415 /UL (ref 850–3900)
LYMPHOCYTES RELATIVE PERCENT: 34.5 % (ref 15–45)
MONOCYTES ABSOLUTE: 685 /UL (ref 200–950)
MONOCYTES RELATIVE PERCENT: 16.7 % (ref 0–12)
NEUTROPHILS ABSOLUTE: 1952 /UL (ref 1500–7800)
NUCLEATED RED BLOOD CELLS: 0 /100 WBC (ref 0–0)
SEGMENTED NEUTROPHILS RELATIVE PERCENT: 47.6 % (ref 40–80)

## 2019-05-08 ENCOUNTER — OFFICE VISIT (OUTPATIENT)
Dept: INTERNAL MEDICINE CLINIC | Age: 59
End: 2019-05-08
Payer: COMMERCIAL

## 2019-05-08 VITALS
SYSTOLIC BLOOD PRESSURE: 146 MMHG | HEART RATE: 72 BPM | DIASTOLIC BLOOD PRESSURE: 88 MMHG | WEIGHT: 205 LBS | BODY MASS INDEX: 31.07 KG/M2 | HEIGHT: 68 IN

## 2019-05-08 DIAGNOSIS — M32.14 LUPUS NEPHRITIS (HCC): ICD-10-CM

## 2019-05-08 DIAGNOSIS — E09.9 STEROID-INDUCED DIABETES (HCC): Primary | ICD-10-CM

## 2019-05-08 DIAGNOSIS — I43 DILATED CARDIOMYOPATHY SECONDARY TO SYSTEMIC LUPUS ERYTHEMATOSUS (HCC): ICD-10-CM

## 2019-05-08 DIAGNOSIS — Z79.4 TYPE 2 DIABETES MELLITUS WITH HYPERGLYCEMIA, WITH LONG-TERM CURRENT USE OF INSULIN (HCC): ICD-10-CM

## 2019-05-08 DIAGNOSIS — M32.19 DILATED CARDIOMYOPATHY SECONDARY TO SYSTEMIC LUPUS ERYTHEMATOSUS (HCC): ICD-10-CM

## 2019-05-08 DIAGNOSIS — T38.0X5A STEROID-INDUCED DIABETES (HCC): Primary | ICD-10-CM

## 2019-05-08 DIAGNOSIS — E11.65 TYPE 2 DIABETES MELLITUS WITH HYPERGLYCEMIA, WITH LONG-TERM CURRENT USE OF INSULIN (HCC): ICD-10-CM

## 2019-05-08 DIAGNOSIS — I15.9 SECONDARY HYPERTENSION: ICD-10-CM

## 2019-05-08 DIAGNOSIS — Z12.11 SCREEN FOR COLON CANCER: ICD-10-CM

## 2019-05-08 PROCEDURE — 3017F COLORECTAL CA SCREEN DOC REV: CPT | Performed by: INTERNAL MEDICINE

## 2019-05-08 PROCEDURE — G8427 DOCREV CUR MEDS BY ELIG CLIN: HCPCS | Performed by: INTERNAL MEDICINE

## 2019-05-08 PROCEDURE — 99214 OFFICE O/P EST MOD 30 MIN: CPT | Performed by: INTERNAL MEDICINE

## 2019-05-08 PROCEDURE — G8417 CALC BMI ABV UP PARAM F/U: HCPCS | Performed by: INTERNAL MEDICINE

## 2019-05-08 PROCEDURE — 3046F HEMOGLOBIN A1C LEVEL >9.0%: CPT | Performed by: INTERNAL MEDICINE

## 2019-05-08 PROCEDURE — 2022F DILAT RTA XM EVC RTNOPTHY: CPT | Performed by: INTERNAL MEDICINE

## 2019-05-08 PROCEDURE — 1036F TOBACCO NON-USER: CPT | Performed by: INTERNAL MEDICINE

## 2019-05-08 ASSESSMENT — PATIENT HEALTH QUESTIONNAIRE - PHQ9
1. LITTLE INTEREST OR PLEASURE IN DOING THINGS: 0
SUM OF ALL RESPONSES TO PHQ9 QUESTIONS 1 & 2: 0
2. FEELING DOWN, DEPRESSED OR HOPELESS: 0
SUM OF ALL RESPONSES TO PHQ QUESTIONS 1-9: 0
SUM OF ALL RESPONSES TO PHQ QUESTIONS 1-9: 0

## 2019-05-08 NOTE — PROGRESS NOTES
(NORVASC) 5 MG tablet, Take 5 mg by mouth, Disp: , Rfl:     torsemide (DEMADEX) 20 MG tablet, Take 20 mg by mouth, Disp: , Rfl:     carvedilol (COREG) 12.5 MG tablet, Take 1 tablet by mouth 2 times daily, Disp: 60 tablet, Rfl: 3    hydroxychloroquine (PLAQUENIL) 200 MG tablet, Take 2 tablets by mouth daily, Disp: 180 tablet, Rfl: 0    mycophenolate (CELLCEPT) 500 MG tablet, Take 500 mg by mouth 2 times daily 2 tablets TID, Disp: , Rfl:     calcium elemental (OYSCO 500) 500 MG TABS tablet, Take 500 mg by mouth 3 times daily, Disp: , Rfl:     sacubitril-valsartan (ENTRESTO) 24-26 MG per tablet, Take 1/2 tablet bid (Patient taking differently: 0.5 tablets daily Take 1/2 tablet bid), Disp: 60 tablet, Rfl: 6    insulin glargine (LANTUS) 100 UNIT/ML injection vial, Inject 24 Units into the skin every morning , Disp: , Rfl:     predniSONE (DELTASONE) 20 MG tablet, Take 1 tablet by mouth 2 times daily (Patient taking differently: Take 5 mg by mouth daily Indications: Takes 1 tablet everyother day ), Disp: 60 tablet, Rfl: 2    Lancets MISC, 1 each by Does not apply route daily, Disp: , Rfl:     Insulin Pen Needle (PEN NEEDLES 31GX5/16\") 31G X 8 MM MISC, 1 each by Does not apply route daily, Disp: , Rfl:     insulin lispro (HUMALOG) 100 UNIT/ML injection cartridge, Inject 8 Units into the skin 3 times daily (before meals), Disp: , Rfl:     Assessment/Plan:  Nacho Gtz was seen today for hypertension and hyperlipidemia. Diagnoses and all orders for this visit:    Steroid-induced diabetes ([facility]  -     Discontinue: insulin glargine (BASAGLAR KWIKPEN) 100 UNIT/ML injection pen; Inject 30 Units into the skin nightly  -     insulin glargine (BASAGLAR KWIKPEN) 100 UNIT/ML injection pen;  Inject 30 Units into the skin nightly    Type 2 diabetes mellitus with hyperglycemia, with long-term current use of insulin ([facility])  -     Hemoglobin A1C; Future    Secondary hypertension  Comments:  Patient has multiple reasons for elevated blood pressure including lupus nephritis, diabetes and other medications including mycophenolate. Lupus nephritis [facility])  Comments:  Nephrology is following.     Dilated cardiomyopathy secondary to systemic lupus erythematosus ([facility]    Screen for colon cancer  -     COLONOSCOPY (Screening)  -     YEVGENIY - Emy Das MD, Gastroenterology, [facility]        Lab Results   Component Value Date    LABA1C 7.2 10/22/2018     Lab Results   Component Value Date    .9 10/22/2018        Derrell Cook MD

## 2019-05-09 DIAGNOSIS — Z79.4 TYPE 2 DIABETES MELLITUS WITH HYPERGLYCEMIA, WITH LONG-TERM CURRENT USE OF INSULIN (HCC): ICD-10-CM

## 2019-05-09 DIAGNOSIS — E11.65 TYPE 2 DIABETES MELLITUS WITH HYPERGLYCEMIA, WITH LONG-TERM CURRENT USE OF INSULIN (HCC): ICD-10-CM

## 2019-05-14 LAB
ESTIMATED AVERAGE GLUCOSE: 148.5 MG/DL
HBA1C MFR BLD: 6.8 %

## 2019-05-23 ENCOUNTER — OFFICE VISIT (OUTPATIENT)
Dept: RHEUMATOLOGY | Age: 59
End: 2019-05-23
Payer: COMMERCIAL

## 2019-05-23 VITALS
BODY MASS INDEX: 30.98 KG/M2 | SYSTOLIC BLOOD PRESSURE: 152 MMHG | HEART RATE: 70 BPM | WEIGHT: 204.4 LBS | DIASTOLIC BLOOD PRESSURE: 86 MMHG | HEIGHT: 68 IN

## 2019-05-23 DIAGNOSIS — M32.14 SYSTEMIC LUPUS ERYTHEMATOSUS WITH GLOMERULAR DISEASE, UNSPECIFIED SLE TYPE (HCC): Primary | ICD-10-CM

## 2019-05-23 PROCEDURE — 1036F TOBACCO NON-USER: CPT | Performed by: INTERNAL MEDICINE

## 2019-05-23 PROCEDURE — 99213 OFFICE O/P EST LOW 20 MIN: CPT | Performed by: INTERNAL MEDICINE

## 2019-05-23 PROCEDURE — G8427 DOCREV CUR MEDS BY ELIG CLIN: HCPCS | Performed by: INTERNAL MEDICINE

## 2019-05-23 PROCEDURE — 3017F COLORECTAL CA SCREEN DOC REV: CPT | Performed by: INTERNAL MEDICINE

## 2019-05-23 PROCEDURE — G8417 CALC BMI ABV UP PARAM F/U: HCPCS | Performed by: INTERNAL MEDICINE

## 2019-05-23 RX ORDER — HYDROXYCHLOROQUINE SULFATE 200 MG/1
400 TABLET, FILM COATED ORAL DAILY
Qty: 180 TABLET | Refills: 1 | Status: SHIPPED | OUTPATIENT
Start: 2019-05-23 | End: 2019-11-27 | Stop reason: SDUPTHER

## 2019-05-23 NOTE — PROGRESS NOTES
Subjective:      Patient ID: Beni Ortega is a 60 y.o. male. HPI  The patient for follow-up of systemic lupus. He's now on CellCept 1000 mg twice daily prednisone 5 mg every other day and Plaquenil 400 mg daily. Overall he feels well. Review of Systems  Denies pleuritic chest pain denies mucosal sores denies Raynaud's denies sicca symptoms  Objective:   Physical Exam BP (!) 170/92 (Site: Left Upper Arm, Position: Sitting, Cuff Size: Medium Adult)   Pulse 70   Ht 5' 8\" (1.727 m)   Wt 204 lb 6.4 oz (92.7 kg)   BMI 31.08 kg/m²   Skin healing lesions on scalp.   One plus soft tissue swelling right wrist.  Lungs clear cardiovascular exam normal sinus rhythm    Assessment:      Systemic lupus      Plan:      Blood work was given to the patient to obtain at his next visit with her nephrologist.  I'll see the patient back in 4 months time        Benton Min MD

## 2019-09-26 ENCOUNTER — OFFICE VISIT (OUTPATIENT)
Dept: RHEUMATOLOGY | Age: 59
End: 2019-09-26
Payer: COMMERCIAL

## 2019-09-26 VITALS
SYSTOLIC BLOOD PRESSURE: 146 MMHG | BODY MASS INDEX: 30.77 KG/M2 | HEIGHT: 68 IN | DIASTOLIC BLOOD PRESSURE: 88 MMHG | HEART RATE: 68 BPM | WEIGHT: 203 LBS

## 2019-09-26 DIAGNOSIS — M32.14 SYSTEMIC LUPUS ERYTHEMATOSUS WITH GLOMERULAR DISEASE, UNSPECIFIED SLE TYPE (HCC): Primary | ICD-10-CM

## 2019-09-26 PROCEDURE — G8417 CALC BMI ABV UP PARAM F/U: HCPCS | Performed by: INTERNAL MEDICINE

## 2019-09-26 PROCEDURE — 99213 OFFICE O/P EST LOW 20 MIN: CPT | Performed by: INTERNAL MEDICINE

## 2019-09-26 PROCEDURE — 1036F TOBACCO NON-USER: CPT | Performed by: INTERNAL MEDICINE

## 2019-09-26 PROCEDURE — G8427 DOCREV CUR MEDS BY ELIG CLIN: HCPCS | Performed by: INTERNAL MEDICINE

## 2019-09-26 PROCEDURE — 3017F COLORECTAL CA SCREEN DOC REV: CPT | Performed by: INTERNAL MEDICINE

## 2019-11-27 DIAGNOSIS — M32.14 SYSTEMIC LUPUS ERYTHEMATOSUS WITH GLOMERULAR DISEASE, UNSPECIFIED SLE TYPE (HCC): ICD-10-CM

## 2019-11-27 RX ORDER — HYDROXYCHLOROQUINE SULFATE 200 MG/1
400 TABLET, FILM COATED ORAL DAILY
Qty: 180 TABLET | Refills: 0 | Status: SHIPPED | OUTPATIENT
Start: 2019-11-27 | End: 2020-02-24 | Stop reason: SDUPTHER

## 2020-02-24 ENCOUNTER — OFFICE VISIT (OUTPATIENT)
Dept: RHEUMATOLOGY | Age: 60
End: 2020-02-24
Payer: COMMERCIAL

## 2020-02-24 VITALS
DIASTOLIC BLOOD PRESSURE: 84 MMHG | SYSTOLIC BLOOD PRESSURE: 134 MMHG | BODY MASS INDEX: 28.19 KG/M2 | HEIGHT: 68 IN | HEART RATE: 76 BPM | WEIGHT: 186 LBS

## 2020-02-24 LAB
C3 COMPLEMENT: 111.4 MG/DL (ref 90–180)
C4 COMPLEMENT: 26 MG/DL (ref 10–40)

## 2020-02-24 PROCEDURE — G8427 DOCREV CUR MEDS BY ELIG CLIN: HCPCS | Performed by: INTERNAL MEDICINE

## 2020-02-24 PROCEDURE — 99213 OFFICE O/P EST LOW 20 MIN: CPT | Performed by: INTERNAL MEDICINE

## 2020-02-24 PROCEDURE — 1036F TOBACCO NON-USER: CPT | Performed by: INTERNAL MEDICINE

## 2020-02-24 PROCEDURE — G8484 FLU IMMUNIZE NO ADMIN: HCPCS | Performed by: INTERNAL MEDICINE

## 2020-02-24 PROCEDURE — 3017F COLORECTAL CA SCREEN DOC REV: CPT | Performed by: INTERNAL MEDICINE

## 2020-02-24 PROCEDURE — G8417 CALC BMI ABV UP PARAM F/U: HCPCS | Performed by: INTERNAL MEDICINE

## 2020-02-24 RX ORDER — HYDROXYCHLOROQUINE SULFATE 200 MG/1
400 TABLET, FILM COATED ORAL DAILY
Qty: 180 TABLET | Refills: 1 | Status: SHIPPED | OUTPATIENT
Start: 2020-02-24 | End: 2020-08-31 | Stop reason: SDUPTHER

## 2020-02-25 LAB — ANTI-DSDNA IGG: <1 IU/ML (ref 0–9)

## 2020-04-16 PROBLEM — E87.5 HYPERKALEMIA: Status: ACTIVE | Noted: 2020-04-16

## 2020-05-15 ENCOUNTER — TELEPHONE (OUTPATIENT)
Dept: INTERNAL MEDICINE CLINIC | Age: 60
End: 2020-05-15

## 2020-05-15 NOTE — TELEPHONE ENCOUNTER
Patient called in requesting lab orders. What are the labs for: To check pt A1C levels    Does the patient need orders faxed  No      Does patient need a follow up phone call? Yes        Verified Phone Number: [phone]    Patient states that if a copy of his orders are placed in his mychart he is able to print them out and go to a location close to his home. He also stated that he wanted to have visit after he does his blood work. Please call back to confirm.

## 2020-05-29 ENCOUNTER — VIRTUAL VISIT (OUTPATIENT)
Dept: INTERNAL MEDICINE CLINIC | Age: 60
End: 2020-05-29
Payer: COMMERCIAL

## 2020-05-29 VITALS
SYSTOLIC BLOOD PRESSURE: 143 MMHG | HEART RATE: 69 BPM | HEIGHT: 68 IN | DIASTOLIC BLOOD PRESSURE: 80 MMHG | WEIGHT: 180 LBS | BODY MASS INDEX: 27.28 KG/M2

## 2020-05-29 PROCEDURE — 99213 OFFICE O/P EST LOW 20 MIN: CPT | Performed by: INTERNAL MEDICINE

## 2020-05-29 PROCEDURE — G8427 DOCREV CUR MEDS BY ELIG CLIN: HCPCS | Performed by: INTERNAL MEDICINE

## 2020-05-29 PROCEDURE — 3046F HEMOGLOBIN A1C LEVEL >9.0%: CPT | Performed by: INTERNAL MEDICINE

## 2020-05-29 PROCEDURE — 3017F COLORECTAL CA SCREEN DOC REV: CPT | Performed by: INTERNAL MEDICINE

## 2020-05-29 PROCEDURE — 2022F DILAT RTA XM EVC RTNOPTHY: CPT | Performed by: INTERNAL MEDICINE

## 2020-05-29 RX ORDER — INSULIN GLARGINE 100 [IU]/ML
INJECTION, SOLUTION SUBCUTANEOUS
Qty: 15 ML | Refills: 3 | Status: SHIPPED | OUTPATIENT
Start: 2020-05-29 | End: 2022-05-09

## 2020-05-29 RX ORDER — PEN NEEDLE, DIABETIC 31 GX5/16"
1 NEEDLE, DISPOSABLE MISCELLANEOUS DAILY
Qty: 100 EACH | Refills: 2 | Status: SHIPPED | OUTPATIENT
Start: 2020-05-29 | End: 2022-05-09

## 2020-05-29 ASSESSMENT — PATIENT HEALTH QUESTIONNAIRE - PHQ9
SUM OF ALL RESPONSES TO PHQ QUESTIONS 1-9: 0
2. FEELING DOWN, DEPRESSED OR HOPELESS: 0
SUM OF ALL RESPONSES TO PHQ QUESTIONS 1-9: 0
SUM OF ALL RESPONSES TO PHQ9 QUESTIONS 1 & 2: 0
1. LITTLE INTEREST OR PLEASURE IN DOING THINGS: 0

## 2020-05-29 NOTE — PROGRESS NOTES
Does not apply route daily, Disp: 100 each, Rfl: 2    blood glucose test strips (ACCU-CHEK CEDRIC) strip, 1 each by In Vitro route daily As needed. , Disp: 100 each, Rfl: 3    carvedilol (COREG) 12.5 MG tablet, TAKE 1 TABLET BY MOUTH TWO TIMES A DAY, Disp: 60 tablet, Rfl: 3    spironolactone (ALDACTONE) 25 MG tablet, Take 1 tablet by mouth daily, Disp: 30 tablet, Rfl: 3    amLODIPine (NORVASC) 5 MG tablet, TAKE 1 TABLET BY MOUTH TWO TIMES A DAY , Disp: 60 tablet, Rfl: 3    mycophenolate (CELLCEPT) 500 MG tablet, Two tabs twice a day, Disp: 120 tablet, Rfl: 3    hydroxychloroquine (PLAQUENIL) 200 MG tablet, Take 2 tablets by mouth daily, Disp: 180 tablet, Rfl: 1    calcium elemental (OYSCO 500) 500 MG TABS tablet, Take 500 mg by mouth 3 times daily, Disp: , Rfl:     Lancets MISC, 1 each by Does not apply route daily, Disp: , Rfl:     Assessment/Plan:  Rasheed Mccain was seen today for diabetes. Diagnoses and all orders for this visit:    Type 2 diabetes mellitus with hyperglycemia, with long-term current use of insulin (HCC)  -     Hemoglobin A1C; Future  -     Diabetic Foot Exam    Steroid-induced diabetes (HCC)  -     insulin glargine (BASAGLAR KWIKPEN) 100 UNIT/ML injection pen; inject 30 units into the skin nightly  -     Insulin Pen Needle (PEN NEEDLES 31GX5/16\") 31G X 8 MM MISC; 1 each by Does not apply route daily  -     blood glucose test strips (ACCU-CHEK CEDRIC) strip; 1 each by In Vitro route daily As needed. -     Hemoglobin A1C; Future    Lupus nephritis (HCC)  Comments:  Patient is seeing Dr. Gertrude Rubio    Nephrotic range proteinuria  Comments:  Patient is seeing Dr. Tracey Jones, unspecified type [facility])  Comments:  Chronic, controlled.        Lab Results   Component Value Date    LABA1C 6.8 05/13/2019     Lab Results   Component Value Date    .5 05/13/2019       Kaylie Alvarez MD

## 2020-06-10 PROBLEM — N18.30 CKD (CHRONIC KIDNEY DISEASE), STAGE III (HCC): Status: ACTIVE | Noted: 2020-06-10

## 2020-08-31 ENCOUNTER — VIRTUAL VISIT (OUTPATIENT)
Dept: RHEUMATOLOGY | Age: 60
End: 2020-08-31
Payer: COMMERCIAL

## 2020-08-31 PROCEDURE — G8420 CALC BMI NORM PARAMETERS: HCPCS | Performed by: INTERNAL MEDICINE

## 2020-08-31 PROCEDURE — 3017F COLORECTAL CA SCREEN DOC REV: CPT | Performed by: INTERNAL MEDICINE

## 2020-08-31 PROCEDURE — 1036F TOBACCO NON-USER: CPT | Performed by: INTERNAL MEDICINE

## 2020-08-31 PROCEDURE — G8427 DOCREV CUR MEDS BY ELIG CLIN: HCPCS | Performed by: INTERNAL MEDICINE

## 2020-08-31 PROCEDURE — 99213 OFFICE O/P EST LOW 20 MIN: CPT | Performed by: INTERNAL MEDICINE

## 2020-08-31 RX ORDER — HYDROXYCHLOROQUINE SULFATE 200 MG/1
300 TABLET, FILM COATED ORAL DAILY
Qty: 135 TABLET | Refills: 1 | Status: SHIPPED | OUTPATIENT
Start: 2020-08-31 | End: 2021-03-02 | Stop reason: SDUPTHER

## 2020-08-31 NOTE — PROGRESS NOTES
Eris Armijo is a 60 y.o. male being evaluated by a Virtual Visit (video visit) encounter to address concerns as mentioned above. A caregiver was present when appropriate. Due to this being a TeleHealth encounter (During TDCJZ-05 public health emergency), evaluation of the following organ systems was limited: Vitals/Constitutional/EENT/Resp/CV/GI//MS/Neuro/Skin/Heme-Lymph-Imm. Pursuant to the emergency declaration under the [address] [place] [address] and the [employer] Act, this Virtual Visit was conducted with patient's (and/or legal guardian's) consent, to reduce the patient's risk of exposure to COVID-19 and provide necessary medical care. The patient (and/or legal guardian) has also been advised to contact this office for worsening conditions or problems, and seek emergency medical treatment and/or call 911 if deemed necessary. Patient identification was verified at the start of the visit: Yes    Total time spent for this encounter: Not billed by time    Services were provided through a video synchronous discussion virtually to substitute for in-person clinic visit. Patient and provider were located at their individual homes. --Rachael Griffiths RN on 8/31/2020 at 11:31 AM    An electronic signature was used to authenticate this note.

## 2020-10-26 PROBLEM — N18.32 STAGE 3B CHRONIC KIDNEY DISEASE (HCC): Status: ACTIVE | Noted: 2020-06-10

## 2020-12-02 ENCOUNTER — OFFICE VISIT (OUTPATIENT)
Dept: INTERNAL MEDICINE CLINIC | Age: 60
End: 2020-12-02
Payer: COMMERCIAL

## 2020-12-02 VITALS
TEMPERATURE: 97.7 F | BODY MASS INDEX: 23.18 KG/M2 | SYSTOLIC BLOOD PRESSURE: 148 MMHG | OXYGEN SATURATION: 99 % | DIASTOLIC BLOOD PRESSURE: 80 MMHG | WEIGHT: 157 LBS | HEART RATE: 69 BPM

## 2020-12-02 PROCEDURE — 90471 IMMUNIZATION ADMIN: CPT | Performed by: INTERNAL MEDICINE

## 2020-12-02 PROCEDURE — 99214 OFFICE O/P EST MOD 30 MIN: CPT | Performed by: INTERNAL MEDICINE

## 2020-12-02 PROCEDURE — 90686 IIV4 VACC NO PRSV 0.5 ML IM: CPT | Performed by: INTERNAL MEDICINE

## 2020-12-02 PROCEDURE — 3017F COLORECTAL CA SCREEN DOC REV: CPT | Performed by: INTERNAL MEDICINE

## 2020-12-02 PROCEDURE — G8427 DOCREV CUR MEDS BY ELIG CLIN: HCPCS | Performed by: INTERNAL MEDICINE

## 2020-12-02 PROCEDURE — 1036F TOBACCO NON-USER: CPT | Performed by: INTERNAL MEDICINE

## 2020-12-02 PROCEDURE — G8482 FLU IMMUNIZE ORDER/ADMIN: HCPCS | Performed by: INTERNAL MEDICINE

## 2020-12-02 PROCEDURE — G8420 CALC BMI NORM PARAMETERS: HCPCS | Performed by: INTERNAL MEDICINE

## 2020-12-02 ASSESSMENT — PATIENT HEALTH QUESTIONNAIRE - PHQ9
SUM OF ALL RESPONSES TO PHQ9 QUESTIONS 1 & 2: 0
SUM OF ALL RESPONSES TO PHQ QUESTIONS 1-9: 0
1. LITTLE INTEREST OR PLEASURE IN DOING THINGS: 0
SUM OF ALL RESPONSES TO PHQ QUESTIONS 1-9: 0
2. FEELING DOWN, DEPRESSED OR HOPELESS: 0
SUM OF ALL RESPONSES TO PHQ QUESTIONS 1-9: 0

## 2020-12-02 ASSESSMENT — ENCOUNTER SYMPTOMS: SHORTNESS OF BREATH: 0

## 2020-12-02 NOTE — PROGRESS NOTES
SUBJECTIVE:  Patient ID: Lj Sarmiento is a 63 y.o. y.o. male     HPI    Lj Sarmiento returns for follow up of hypertension. Patient has been taking His medications as prescribed. Patient's blood pressure is  controlled. Side effects related to taking the medications include no medication side effects noted    Patient returns to the office for follow up of his diabetes. His last hemoglobin A1c was 6.3. He is compliant with his medications and diet. Patient has no symptoms related to his condition such as blurred vision, slurred speech, chest pain orshortness of breath. Review of Systems   Constitutional: Negative for unexpected weight change (Tremendous weight loss). Respiratory: Negative for shortness of breath. Cardiovascular: Negative for chest pain, palpitations and leg swelling. OBJECTIVE:    BP (!) 148/80   Pulse 69   Temp 97.7 °F (36.5 °C) (Infrared)   Wt 157 lb (71.2 kg)   SpO2 99%   BMI 23.18 kg/m²      Physical Exam  Vitals signs reviewed. Constitutional:       General: He is not in acute distress. Appearance: He is well-developed. He is not diaphoretic. HENT:      Head: Normocephalic and atraumatic. Cardiovascular:      Rate and Rhythm: Normal rate and regular rhythm. Pulses: Normal pulses. Heart sounds: Normal heart sounds. No murmur. No friction rub. No gallop. Pulmonary:      Effort: Pulmonary effort is normal. No respiratory distress. Breath sounds: Normal breath sounds. No stridor. No wheezing, rhonchi or rales. Chest:      Chest wall: No tenderness. Neurological:      Mental Status: He is alert and oriented to person, place, and time. Cranial Nerves: No cranial nerve deficit. Psychiatric:         Behavior: Behavior normal.         Thought Content:  Thought content normal.         Judgment: Judgment normal.          Current Outpatient Medications:     sodium zirconium cyclosilicate (LOKELMA) 10 g PACK oral suspension, Take 10 g by mouth three times a week, Disp: 30 packet, Rfl: 0    hydroxychloroquine (PLAQUENIL) 200 MG tablet, Take 1.5 tablets by mouth daily, Disp: 135 tablet, Rfl: 1    amLODIPine (NORVASC) 5 MG tablet, TAKE 1 TABLET BY MOUTH TWO TIMES A DAY, Disp: 180 tablet, Rfl: 2    spironolactone (ALDACTONE) 25 MG tablet, Take 1 tablet by mouth daily, Disp: 90 tablet, Rfl: 2    carvedilol (COREG) 12.5 MG tablet, TAKE 1 TABLET BY MOUTH TWO TIMES A DAY, Disp: 180 tablet, Rfl: 2    mycophenolate (CELLCEPT) 500 MG tablet, Two tabs twice a day, Disp: 360 tablet, Rfl: 1    sodium zirconium cyclosilicate (LOKELMA) 10 g PACK oral suspension, Take 10 g by mouth every other day, Disp: 15 packet, Rfl: 3    Insulin Pen Needle (PEN NEEDLES 31GX5/16\") 31G X 8 MM MISC, 1 each by Does not apply route daily, Disp: 100 each, Rfl: 2    blood glucose test strips (ACCU-CHEK CEDRIC) strip, 1 each by In Vitro route daily As needed. , Disp: 100 each, Rfl: 3    calcium elemental (OYSCO 500) 500 MG TABS tablet, Take 500 mg by mouth 3 times daily, Disp: , Rfl:     Lancets MISC, 1 each by Does not apply route daily, Disp: , Rfl:     insulin glargine (BASAGLAR KWIKPEN) 100 UNIT/ML injection pen, inject 30 units into the skin nightly (Patient not taking: Reported on 12/2/2020), Disp: 15 mL, Rfl: 3    Assessment/Plan:  Chun Martinez was seen today for diabetes and hypertension. Diagnoses and all orders for this visit:    Need for influenza vaccination  -     INFLUENZA, QUADV, 3 YRS AND OLDER, IM PF, PREFILL SYR OR SDV, 0.5ML (AFLURIA QUADV, PF)    Steroid-induced diabetes mellitus, sequela ([facility]  -     Hemoglobin A1C; Future  -     Microalbumin / Creatinine Urine Ratio; Future    Lupus nephritis [facility])  Comments:  Patient has been seeing rheumatology for management of this problem. It is stable at this time. Dilated cardiomyopathy secondary to systemic lupus erythematosus ([facility]  Comments:  Josiane Jones, controlled. Patient is seeing cardiology and rheumatology.       Lab Results   Component Value Date    LABA1C 6.3 (H) 06/01/2020     Lab Results   Component Value Date    .5 05/13/2019       Elif Rutherford MD

## 2021-01-04 LAB
AVERAGE GLUCOSE: NORMAL
HBA1C MFR BLD: 6.2 %

## 2021-03-02 ENCOUNTER — VIRTUAL VISIT (OUTPATIENT)
Dept: RHEUMATOLOGY | Age: 61
End: 2021-03-02
Payer: COMMERCIAL

## 2021-03-02 DIAGNOSIS — M32.14 SYSTEMIC LUPUS ERYTHEMATOSUS WITH GLOMERULAR DISEASE, UNSPECIFIED SLE TYPE (HCC): Primary | ICD-10-CM

## 2021-03-02 DIAGNOSIS — Z79.899 ENCOUNTER FOR LONG-TERM (CURRENT) USE OF HIGH-RISK MEDICATION: ICD-10-CM

## 2021-03-02 PROCEDURE — 3017F COLORECTAL CA SCREEN DOC REV: CPT | Performed by: INTERNAL MEDICINE

## 2021-03-02 PROCEDURE — 99213 OFFICE O/P EST LOW 20 MIN: CPT | Performed by: INTERNAL MEDICINE

## 2021-03-02 PROCEDURE — G8427 DOCREV CUR MEDS BY ELIG CLIN: HCPCS | Performed by: INTERNAL MEDICINE

## 2021-03-02 RX ORDER — HYDROXYCHLOROQUINE SULFATE 200 MG/1
300 TABLET, FILM COATED ORAL DAILY
Qty: 135 TABLET | Refills: 1 | Status: SHIPPED | OUTPATIENT
Start: 2021-03-02 | End: 2021-08-17 | Stop reason: SDUPTHER

## 2021-03-02 NOTE — PROGRESS NOTES
Rola Pham, was evaluated through a synchronous (real-time) audio-video encounter. The patient (or guardian if applicable) is aware that this is a billable service. Verbal consent to proceed has been obtained within the past 12 months. The visit was conducted pursuant to the emergency declaration under the [address] [place] [address], [address] [place] [address] authority and the [employer] General Act. Patient identification was verified, and a caregiver was present when appropriate. The patient was located in a state where the provider was credentialed to provide care. Total time spent for this encounter: Not billed by time    --Dexter Pena RN on 3/2/2021 at 9:03 AM    An electronic signature was used to authenticate this note.

## 2021-03-02 NOTE — PROGRESS NOTES
Subjective:       Mary Monk is a 63 y.o. male the patient returns for follow-up of systemic lupus erythematosus. He continues on Plaquenil 300 mg a day. He also is on CellCept 500 mg twice daily. He is no longer on prednisone      Review of Systems:    Denies alopecia denies mucosal sores denies sicca symptoms denies pleuritic chest pain denies Raynaud's denies abdominal pain denies joint swelling    Objective:     Alert male in no acute distress respirations within normal limits musculoskeletal exam no synovitis skin no rash    Assessment:      SLE volume the    Plan:      The patient will get blood work to monitor activity of disease. Previous labs from his nephrologist were reviewed. I will see the patient back in 6 months time.   Need for Covid immunization was discussed        Sreekanth Banda MD, 3/2/2021 8:46 AM

## 2021-03-08 LAB
C3 COMPLEMENT: 107 MG/DL (ref 87–200)
C4 COMPLEMENT: 33 MG/DL (ref 19–52)

## 2021-03-25 RX ORDER — ATORVASTATIN CALCIUM 20 MG/1
20 TABLET, FILM COATED ORAL DAILY
Qty: 30 TABLET | Refills: 5 | Status: SHIPPED | OUTPATIENT
Start: 2021-03-25 | End: 2021-09-27

## 2021-04-21 ENCOUNTER — HOSPITAL ENCOUNTER (OUTPATIENT)
Dept: NON INVASIVE DIAGNOSTICS | Age: 61
Discharge: HOME | End: 2021-04-21
Payer: COMMERCIAL

## 2021-04-21 PROCEDURE — 93788 AMBL BP MNTR W/SW A/R: CPT

## 2021-04-21 PROCEDURE — 93786 AMBL BP MNTR W/SW REC ONLY: CPT

## 2021-06-02 ENCOUNTER — OFFICE VISIT (OUTPATIENT)
Dept: INTERNAL MEDICINE CLINIC | Age: 61
End: 2021-06-02
Payer: COMMERCIAL

## 2021-06-02 VITALS
BODY MASS INDEX: 23.67 KG/M2 | WEIGHT: 158 LBS | OXYGEN SATURATION: 100 % | HEART RATE: 82 BPM | DIASTOLIC BLOOD PRESSURE: 80 MMHG | TEMPERATURE: 97 F | SYSTOLIC BLOOD PRESSURE: 150 MMHG

## 2021-06-02 DIAGNOSIS — M32.14 LUPUS NEPHRITIS (HCC): Primary | ICD-10-CM

## 2021-06-02 DIAGNOSIS — E09.9 STEROID-INDUCED DIABETES MELLITUS, INITIAL ENCOUNTER (HCC): ICD-10-CM

## 2021-06-02 DIAGNOSIS — M32.19 DILATED CARDIOMYOPATHY SECONDARY TO SYSTEMIC LUPUS ERYTHEMATOSUS (HCC): ICD-10-CM

## 2021-06-02 DIAGNOSIS — I43 DILATED CARDIOMYOPATHY SECONDARY TO SYSTEMIC LUPUS ERYTHEMATOSUS (HCC): ICD-10-CM

## 2021-06-02 DIAGNOSIS — I15.9 SECONDARY HYPERTENSION: ICD-10-CM

## 2021-06-02 DIAGNOSIS — T38.0X5A STEROID-INDUCED DIABETES MELLITUS, INITIAL ENCOUNTER (HCC): ICD-10-CM

## 2021-06-02 PROCEDURE — G8420 CALC BMI NORM PARAMETERS: HCPCS | Performed by: INTERNAL MEDICINE

## 2021-06-02 PROCEDURE — 1036F TOBACCO NON-USER: CPT | Performed by: INTERNAL MEDICINE

## 2021-06-02 PROCEDURE — 99213 OFFICE O/P EST LOW 20 MIN: CPT | Performed by: INTERNAL MEDICINE

## 2021-06-02 PROCEDURE — G8427 DOCREV CUR MEDS BY ELIG CLIN: HCPCS | Performed by: INTERNAL MEDICINE

## 2021-06-02 PROCEDURE — 3017F COLORECTAL CA SCREEN DOC REV: CPT | Performed by: INTERNAL MEDICINE

## 2021-06-02 SDOH — ECONOMIC STABILITY: FOOD INSECURITY: WITHIN THE PAST 12 MONTHS, YOU WORRIED THAT YOUR FOOD WOULD RUN OUT BEFORE YOU GOT MONEY TO BUY MORE.: NEVER TRUE

## 2021-06-02 SDOH — ECONOMIC STABILITY: FOOD INSECURITY: WITHIN THE PAST 12 MONTHS, THE FOOD YOU BOUGHT JUST DIDN'T LAST AND YOU DIDN'T HAVE MONEY TO GET MORE.: NEVER TRUE

## 2021-06-02 ASSESSMENT — PATIENT HEALTH QUESTIONNAIRE - PHQ9
2. FEELING DOWN, DEPRESSED OR HOPELESS: 0
SUM OF ALL RESPONSES TO PHQ QUESTIONS 1-9: 0
SUM OF ALL RESPONSES TO PHQ QUESTIONS 1-9: 0
1. LITTLE INTEREST OR PLEASURE IN DOING THINGS: 0
SUM OF ALL RESPONSES TO PHQ QUESTIONS 1-9: 0
SUM OF ALL RESPONSES TO PHQ9 QUESTIONS 1 & 2: 0

## 2021-06-02 ASSESSMENT — SOCIAL DETERMINANTS OF HEALTH (SDOH): HOW HARD IS IT FOR YOU TO PAY FOR THE VERY BASICS LIKE FOOD, HOUSING, MEDICAL CARE, AND HEATING?: NOT HARD AT ALL

## 2021-06-02 NOTE — PROGRESS NOTES
Chief Complaint   Patient presents with    Hypertension    Diabetes     Vitals:    06/02/21 0841   BP: (!) 150/80   Pulse:    Temp:    SpO2:      PHQ Scores 6/2/2021 12/2/2020 5/29/2020 5/8/2019 6/15/2018   PHQ2 Score 0 0 0 0 0   PHQ9 Score 0 0 0 0 0     Interpretation of Total Score Depression Severity: 1-4 = Minimal depression, 5-9 = Mild depression, 10-14 = Moderate depression, 15-19 = Moderately severe depression, 20-27 = Severe depression    Physical Exam  Vitals reviewed. Constitutional:       General: He is not in acute distress. Appearance: He is well-developed. He is not diaphoretic. HENT:      Head: Normocephalic and atraumatic. Pulmonary:      Effort: Pulmonary effort is normal.   Neurological:      Mental Status: He is alert and oriented to person, place, and time. Cranial Nerves: No cranial nerve deficit. Psychiatric:         Behavior: Behavior normal.         Thought Content: Thought content normal.         Judgment: Judgment normal.       Assessment/Plan:  Gaby Garcia was seen today for hypertension and diabetes. Diagnoses and all orders for this visit:    Lupus nephritis [facility])  Comments:  Rheumatology is following     Dilated cardiomyopathy secondary to systemic lupus erythematosus ([facility] [address]  Comments:  Managed by rheumatology. Secondary hypertension  Comments:  Uncontrolled. Apparently, his ambulatory numbers are better. He is going to bring in his monitor. Steroid-induced diabetes mellitus, initial encounter [facility])  Comments:  She reports to me that he is not currently taking any of his medications for management of his diabetes.   Orders:  -     Hemoglobin A1C; Future        MD Angus Moreno

## 2021-08-16 ENCOUNTER — PATIENT MESSAGE (OUTPATIENT)
Dept: RHEUMATOLOGY | Age: 61
End: 2021-08-16

## 2021-08-16 DIAGNOSIS — Z79.899 ENCOUNTER FOR LONG-TERM (CURRENT) USE OF HIGH-RISK MEDICATION: ICD-10-CM

## 2021-08-16 DIAGNOSIS — M32.14 SYSTEMIC LUPUS ERYTHEMATOSUS WITH GLOMERULAR DISEASE, UNSPECIFIED SLE TYPE (HCC): ICD-10-CM

## 2021-08-18 RX ORDER — HYDROXYCHLOROQUINE SULFATE 200 MG/1
300 TABLET, FILM COATED ORAL DAILY
Qty: 135 TABLET | Refills: 0 | Status: SHIPPED | OUTPATIENT
Start: 2021-08-18 | End: 2021-12-08 | Stop reason: SDUPTHER

## 2021-09-14 ENCOUNTER — OFFICE VISIT (OUTPATIENT)
Dept: RHEUMATOLOGY | Age: 61
End: 2021-09-14
Payer: COMMERCIAL

## 2021-09-14 VITALS
HEIGHT: 69 IN | BODY MASS INDEX: 22.66 KG/M2 | WEIGHT: 153 LBS | OXYGEN SATURATION: 98 % | DIASTOLIC BLOOD PRESSURE: 98 MMHG | SYSTOLIC BLOOD PRESSURE: 160 MMHG | HEART RATE: 86 BPM

## 2021-09-14 DIAGNOSIS — M32.14 SYSTEMIC LUPUS ERYTHEMATOSUS WITH GLOMERULAR DISEASE, UNSPECIFIED SLE TYPE (HCC): Primary | ICD-10-CM

## 2021-09-14 PROCEDURE — 3017F COLORECTAL CA SCREEN DOC REV: CPT | Performed by: INTERNAL MEDICINE

## 2021-09-14 PROCEDURE — G8420 CALC BMI NORM PARAMETERS: HCPCS | Performed by: INTERNAL MEDICINE

## 2021-09-14 PROCEDURE — G8427 DOCREV CUR MEDS BY ELIG CLIN: HCPCS | Performed by: INTERNAL MEDICINE

## 2021-09-14 PROCEDURE — 99213 OFFICE O/P EST LOW 20 MIN: CPT | Performed by: INTERNAL MEDICINE

## 2021-09-14 PROCEDURE — 1036F TOBACCO NON-USER: CPT | Performed by: INTERNAL MEDICINE

## 2021-09-14 NOTE — PROGRESS NOTES
Subjective:       Srinivas Graham is a 57 y.o. male the patient returns for follow-up of lupus. She is currently on CellCept 500 mg twice daily and Plaquenil 300 mg a day. He was reminded that he is due for an eye exam.  He can do all his ADLs he feels well      Review of Systems:      Denies photosensitivity denies sicca symptoms denies any sores denies Raynaud's denies abdominal pain denies nausea denies rash  Objective:     BP (!) 160/98 Comment: Pt indicates he has white coat syndrome  Pulse 86   Ht 5' 9\" (1.753 m)   Wt 153 lb (69.4 kg)   SpO2 98%   BMI 22.59 kg/m²   Alert male in no acute distress. Lungs clear to auscultation. Cardiovascular exam normal sinus rhythm musculoskeletal exam no synovitis skin no active rash    Assessment:      Systemic lupus erythematosus    Plan:      The patient will get blood work for his nephrologist in the near future this will be supplemented today double-stranded DNA antibody ordered today. I will see patient back in 6 months time.         Familia Carrion MD, MD, 9/14/2021 9:38 AM

## 2021-09-27 LAB
ALBUMIN SERPL-MCNC: 3.8 G/DL (ref 3.5–5.7)
ANION GAP SERPL CALCULATED.3IONS-SCNC: 13 MMOL/L (ref 3–16)
BILIRUBIN URINE: NEGATIVE
BUN BLDV-MCNC: 38 MG/DL (ref 7–25)
C3 COMPLEMENT: 105 MG/DL (ref 87–200)
C4 COMPLEMENT: 29 MG/DL (ref 19–52)
CALCIUM SERPL-MCNC: 10 MG/DL (ref 8.6–10.3)
CHLORIDE BLD-SCNC: 106 MMOL/L (ref 98–110)
CLARITY: ABNORMAL
CO2: 23 MMOL/L (ref 21–33)
COLOR: YELLOW
CREAT SERPL-MCNC: 1.96 MG/DL (ref 0.6–1.3)
CREATININE URINE: 130.7 MG/DL
ERYTHROCYTES URINE: NEGATIVE
GFR, ESTIMATED: 36 SEE NOTE.
GFR, ESTIMATED: 41 SEE NOTE.
GLUCOSE BLD-MCNC: 96 MG/DL (ref 70–100)
GLUCOSE URINE: NEGATIVE MG/DL
HCT VFR BLD CALC: 38 % (ref 38.5–50)
HEMOGLOBIN: 12.7 G/DL (ref 13.2–17.1)
HYALINE CASTS: 14 /LPF (ref 0–2)
KETONES, URINE: NEGATIVE MG/DL
LEUKOCYTE ESTERASE, URINE: NEGATIVE
LEUKOCYTES, UA: 1 /HPF (ref 0–5)
MAGNESIUM: 1.8 MG/DL (ref 1.5–2.5)
MCH RBC QN AUTO: 31.7 PG (ref 27–33)
MCHC RBC AUTO-ENTMCNC: 33.6 G/DL (ref 32–36)
MCV RBC AUTO: 94.5 FL (ref 80–100)
NITRITE, URINE: NEGATIVE
OSMOLALITY CALCULATION: 303 MOSM/KG (ref 278–305)
PDW BLD-RTO: 12.8 % (ref 11–15)
PHOSPHORUS: 4.4 MG/DL (ref 2.1–4.7)
PLATELET # BLD: 182 10E3/UL (ref 140–400)
PMV BLD AUTO: 9.2 FL (ref 7.5–11.5)
POC PH ARTERIAL: 5 (ref 5–8)
POTASSIUM SERPL-SCNC: 4.6 MMOL/L (ref 3.5–5.3)
PROTEIN UA: >=500 MG/DL
PROTEIN, URINE, RANDOM: 251 MG/DL
PROTEIN/CREAT RATIO URINE RAN: 1.92 RATIO
RBC # BLD: 4.02 10E6/UL (ref 4.2–5.8)
RBC UA: 1 /HPF (ref 0–3)
SODIUM BLD-SCNC: 142 MMOL/L (ref 133–146)
SPECIFIC GRAVITY UA: 1.01 (ref 1–1.03)
UROBILINOGEN, URINE: <2 MG/DL (ref 0.2–1.9)
WBC # BLD: 3.4 10E3/UL (ref 3.8–10.8)

## 2021-09-27 RX ORDER — ATORVASTATIN CALCIUM 20 MG/1
TABLET, FILM COATED ORAL
Qty: 30 TABLET | Refills: 2 | Status: SHIPPED | OUTPATIENT
Start: 2021-09-27 | End: 2021-12-22

## 2021-09-29 LAB — DSDNA ANTIBODY: <1 IU/ML (ref 0–9)

## 2021-12-03 ENCOUNTER — OFFICE VISIT (OUTPATIENT)
Dept: INTERNAL MEDICINE CLINIC | Age: 61
End: 2021-12-03
Payer: COMMERCIAL

## 2021-12-03 VITALS
HEART RATE: 71 BPM | OXYGEN SATURATION: 100 % | DIASTOLIC BLOOD PRESSURE: 74 MMHG | SYSTOLIC BLOOD PRESSURE: 134 MMHG | BODY MASS INDEX: 23.25 KG/M2 | HEIGHT: 69 IN | WEIGHT: 157 LBS | TEMPERATURE: 97.6 F

## 2021-12-03 DIAGNOSIS — Z79.4 TYPE 2 DIABETES MELLITUS WITH HYPERGLYCEMIA, WITH LONG-TERM CURRENT USE OF INSULIN (HCC): ICD-10-CM

## 2021-12-03 DIAGNOSIS — Z00.00 ROUTINE ADULT HEALTH MAINTENANCE: ICD-10-CM

## 2021-12-03 DIAGNOSIS — Z11.59 NEED FOR HEPATITIS C SCREENING TEST: ICD-10-CM

## 2021-12-03 DIAGNOSIS — N18.32 STAGE 3B CHRONIC KIDNEY DISEASE (HCC): ICD-10-CM

## 2021-12-03 DIAGNOSIS — M32.19 DILATED CARDIOMYOPATHY SECONDARY TO SYSTEMIC LUPUS ERYTHEMATOSUS (HCC): Primary | ICD-10-CM

## 2021-12-03 DIAGNOSIS — Z11.4 SCREENING FOR HUMAN IMMUNODEFICIENCY VIRUS: ICD-10-CM

## 2021-12-03 DIAGNOSIS — E11.65 TYPE 2 DIABETES MELLITUS WITH HYPERGLYCEMIA, WITH LONG-TERM CURRENT USE OF INSULIN (HCC): ICD-10-CM

## 2021-12-03 DIAGNOSIS — I43 DILATED CARDIOMYOPATHY SECONDARY TO SYSTEMIC LUPUS ERYTHEMATOSUS (HCC): Primary | ICD-10-CM

## 2021-12-03 DIAGNOSIS — M32.14: ICD-10-CM

## 2021-12-03 DIAGNOSIS — Z12.11 COLON CANCER SCREENING: ICD-10-CM

## 2021-12-03 PROBLEM — I42.9 CARDIOMYOPATHY (HCC): Status: RESOLVED | Noted: 2018-01-01 | Resolved: 2021-12-03

## 2021-12-03 PROCEDURE — G8482 FLU IMMUNIZE ORDER/ADMIN: HCPCS | Performed by: INTERNAL MEDICINE

## 2021-12-03 PROCEDURE — 3044F HG A1C LEVEL LT 7.0%: CPT | Performed by: INTERNAL MEDICINE

## 2021-12-03 PROCEDURE — 99214 OFFICE O/P EST MOD 30 MIN: CPT | Performed by: INTERNAL MEDICINE

## 2021-12-03 PROCEDURE — G8420 CALC BMI NORM PARAMETERS: HCPCS | Performed by: INTERNAL MEDICINE

## 2021-12-03 PROCEDURE — 1036F TOBACCO NON-USER: CPT | Performed by: INTERNAL MEDICINE

## 2021-12-03 PROCEDURE — G8427 DOCREV CUR MEDS BY ELIG CLIN: HCPCS | Performed by: INTERNAL MEDICINE

## 2021-12-03 PROCEDURE — 2022F DILAT RTA XM EVC RTNOPTHY: CPT | Performed by: INTERNAL MEDICINE

## 2021-12-03 PROCEDURE — 3017F COLORECTAL CA SCREEN DOC REV: CPT | Performed by: INTERNAL MEDICINE

## 2021-12-03 ASSESSMENT — PATIENT HEALTH QUESTIONNAIRE - PHQ9
2. FEELING DOWN, DEPRESSED OR HOPELESS: 0
1. LITTLE INTEREST OR PLEASURE IN DOING THINGS: 0
SUM OF ALL RESPONSES TO PHQ9 QUESTIONS 1 & 2: 0
SUM OF ALL RESPONSES TO PHQ QUESTIONS 1-9: 0

## 2021-12-03 NOTE — PROGRESS NOTES
Chief Complaint   Patient presents with    Hypertension       Assessment/Plan:  Fuad Seals was seen today for hypertension. 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) (HCC)  -     Microalbumin / Creatinine Urine Ratio; Future  -     Comprehensive Metabolic Panel; Future    Routine adult health maintenance  -     T4, Free; Future  -     TSH without Reflex; Future    Colon cancer screening  -     COLONOSCOPY (Screening); Future  -     [facility]    Screening for human immunodeficiency virus  -     HIV-1 AND HIV-2 ANTIBODIES; Future    Need for hepatitis C screening test  -     HEPATITIS C ANTIBODY; Future      Vitals:    12/03/21 0805   BP: 134/74   Pulse:    Temp:    SpO2:        Physical Exam  Vitals and nursing note reviewed. Constitutional:       General: He is not in acute distress. Appearance: He is well-developed. He is not diaphoretic. HENT:      Head: Normocephalic and atraumatic. Cardiovascular:      Rate and Rhythm: Normal rate and regular rhythm. Heart sounds: Normal heart sounds. No murmur heard. No friction rub. No gallop. Pulmonary:      Effort: Pulmonary effort is normal. No respiratory distress. Breath sounds: Normal breath sounds. No wheezing or rales. Chest:      Chest wall: No tenderness. Musculoskeletal:      Comments: No calluses or open lesions   Skin:     General: Skin is warm.       Findings: No erythema or rash. Neurological:      Mental Status: He is alert and oriented to person, place, and time. Cranial Nerves: No cranial nerve deficit. Comments: Normal sensation to microfilament bilaterally. Psychiatric:         Behavior: Behavior normal.         Thought Content:  Thought content normal.         Judgment: Judgment normal.         PHQ Scores 12/3/2021 6/2/2021 12/2/2020 5/29/2020 5/8/2019 6/15/2018   PHQ2 Score 0 0 0 0 0 0   PHQ9 Score 0 0 0 0 0 0     Interpretation of Total Score Depression Severity: 1-4 = Minimal depression, 5-9 = Mild depression, 10-14 = Moderate depression, 15-19 = Moderately severe depression, 20-27 = Severe depression    Wesley Bryan MD  [place]

## 2021-12-08 DIAGNOSIS — Z79.899 ENCOUNTER FOR LONG-TERM (CURRENT) USE OF HIGH-RISK MEDICATION: ICD-10-CM

## 2021-12-08 DIAGNOSIS — M32.14 SYSTEMIC LUPUS ERYTHEMATOSUS WITH GLOMERULAR DISEASE, UNSPECIFIED SLE TYPE (HCC): ICD-10-CM

## 2021-12-14 RX ORDER — HYDROXYCHLOROQUINE SULFATE 200 MG/1
300 TABLET, FILM COATED ORAL DAILY
Qty: 135 TABLET | Refills: 0 | Status: SHIPPED | OUTPATIENT
Start: 2021-12-14 | End: 2022-03-16 | Stop reason: SDUPTHER

## 2022-01-04 LAB
ALBUMIN SERPL-MCNC: 4.2 G/DL (ref 3.5–5.7)
ALP BLD-CCNC: 39 U/L (ref 36–125)
ALT SERPL-CCNC: 17 U/L (ref 7–52)
ANION GAP SERPL CALCULATED.3IONS-SCNC: 7 MMOL/L (ref 3–16)
AST SERPL-CCNC: 25 U/L (ref 13–39)
BILIRUB SERPL-MCNC: 0.4 MG/DL (ref 0–1.5)
BILIRUBIN URINE: NEGATIVE
BUN BLDV-MCNC: 35 MG/DL (ref 7–25)
CALCIUM SERPL-MCNC: 9.9 MG/DL (ref 8.6–10.3)
CHLORIDE BLD-SCNC: 106 MMOL/L (ref 98–110)
CHOLESTEROL, TOTAL: 155 MG/DL (ref 0–200)
CLARITY: CLEAR
CO2: 28 MMOL/L (ref 21–33)
COLOR: YELLOW
CREAT SERPL-MCNC: 1.79 MG/DL (ref 0.6–1.3)
CREATININE URINE: 106 MG/DL
CREATININE URINE: 106 MG/DL
ERYTHROCYTES URINE: NEGATIVE
GFR, ESTIMATED: 40 SEE NOTE.
GFR, ESTIMATED: 46 SEE NOTE.
GLUCOSE BLD-MCNC: 118 MG/DL (ref 70–100)
GLUCOSE URINE: NEGATIVE MG/DL
HBA1C MFR BLD: 6.1 % (ref 4–5.6)
HCT VFR BLD CALC: 40.9 % (ref 38.5–50)
HDLC SERPL-MCNC: 56 MG/DL (ref 60–92)
HEMOGLOBIN: 13.7 G/DL (ref 13.2–17.1)
HEPATITIS C ANTIBODY: NONREACTIVE
HIV 1+2 AB+HIV1P24 AG, EIA: NONREACTIVE
KETONES, URINE: NEGATIVE MG/DL
LDL CHOLESTEROL CALCULATED: 85 MG/DL
LEUKOCYTE ESTERASE, URINE: NEGATIVE
LEUKOCYTES, UA: 1 /HPF (ref 0–5)
MAGNESIUM: 2.1 MG/DL (ref 1.5–2.5)
MCH RBC QN AUTO: 31.6 PG (ref 27–33)
MCHC RBC AUTO-ENTMCNC: 33.5 G/DL (ref 32–36)
MCV RBC AUTO: 94.5 FL (ref 80–100)
MICROALBUMIN UR-MCNC: 1332 MG/L (ref 0–17)
MICROALBUMIN/CREAT UR-RTO: 1256.6 MG/G (ref 0–30)
NITRITE, URINE: NEGATIVE
OSMOLALITY CALCULATION: 301 MOSM/KG (ref 278–305)
PDW BLD-RTO: 12.8 % (ref 11–15)
PHOSPHORUS: 4 MG/DL (ref 2.1–4.7)
PLATELET # BLD: 206 10E3/UL (ref 140–400)
PMV BLD AUTO: 8.3 FL (ref 7.5–11.5)
POC PH ARTERIAL: 6 (ref 5–8)
POTASSIUM SERPL-SCNC: 5.3 MMOL/L (ref 3.5–5.3)
PROTEIN UA: >=500 MG/DL
PROTEIN, URINE, RANDOM: 180 MG/DL
PROTEIN/CREAT RATIO URINE RAN: 1.7 RATIO
RBC # BLD: 4.33 10E6/UL (ref 4.2–5.8)
SODIUM BLD-SCNC: 141 MMOL/L (ref 133–146)
SPECIFIC GRAVITY UA: 1.01 (ref 1–1.03)
T4 FREE: 0.59 NG/DL (ref 0.61–1.76)
TOTAL PROTEIN: 7.1 G/DL (ref 6.4–8.9)
TRIGL SERPL-MCNC: 71 MG/DL (ref 10–149)
TSH, 3RD GENERATION: 1.28 UIU/ML (ref 0.45–4.12)
UROBILINOGEN, URINE: <2 MG/DL (ref 0.2–1.9)
WBC # BLD: 2.7 10E3/UL (ref 3.8–10.8)

## 2022-01-04 NOTE — RESULT ENCOUNTER NOTE
Sharda Velezer      Your recent lab results are normal.    eVronica Chand MD  [address]

## 2022-01-11 ENCOUNTER — TELEPHONE (OUTPATIENT)
Dept: SURGERY | Age: 62
End: 2022-01-11

## 2022-01-11 NOTE — TELEPHONE ENCOUNTER
Patient has been scheduled for:    Procedure: Colonoscopy   Date: 2/9/22  Time: 9:30 AM   Arrival: 8:00 AM   [facility]   ASA?: N/A   Prep? Colon, pt declined phone review, emailed     Pre-op? Day of by Dr. Corie Chiang? N/A    Patient advised they will need a . Orders faxed to surgery scheduling. Instructions have been emailed to: [email or web address]

## 2022-02-08 ENCOUNTER — TELEPHONE (OUTPATIENT)
Dept: SURGERY | Age: 62
End: 2022-02-08

## 2022-02-08 ENCOUNTER — ANESTHESIA EVENT (OUTPATIENT)
Dept: ENDOSCOPY | Age: 62
End: 2022-02-08
Payer: COMMERCIAL

## 2022-02-08 NOTE — TELEPHONE ENCOUNTER
I have placed a reminder call to patient for upcoming procedure. Did you speak directly to patient or leave a voicemail? Patient    Prep? NPO 12AM  Colonoscopy prep    Must have a  that is over the age of 25. Must be a friend or family member that can be responsible for signing them out after surgery. Arrive at the main entrance of [facility] at [place] patient an Hany Wayne can bring him, but they cannot take him home. He is going to try and find a ride home.

## 2022-02-09 ENCOUNTER — ANESTHESIA (OUTPATIENT)
Dept: ENDOSCOPY | Age: 62
End: 2022-02-09
Payer: COMMERCIAL

## 2022-02-09 ENCOUNTER — HOSPITAL ENCOUNTER (OUTPATIENT)
Age: 62
Setting detail: OUTPATIENT SURGERY
Discharge: HOME | End: 2022-02-09
Attending: SURGERY | Admitting: SURGERY
Payer: COMMERCIAL

## 2022-02-09 ENCOUNTER — TELEPHONE (OUTPATIENT)
Dept: SURGERY | Age: 62
End: 2022-02-09

## 2022-02-09 VITALS
SYSTOLIC BLOOD PRESSURE: 125 MMHG | BODY MASS INDEX: 24.44 KG/M2 | DIASTOLIC BLOOD PRESSURE: 75 MMHG | OXYGEN SATURATION: 99 % | WEIGHT: 165 LBS | TEMPERATURE: 97 F | HEART RATE: 60 BPM | RESPIRATION RATE: 16 BRPM | HEIGHT: 69 IN

## 2022-02-09 VITALS
OXYGEN SATURATION: 97 % | SYSTOLIC BLOOD PRESSURE: 152 MMHG | DIASTOLIC BLOOD PRESSURE: 75 MMHG | RESPIRATION RATE: 25 BRPM

## 2022-02-09 LAB
GLUCOSE BLD-MCNC: 119 MG/DL (ref 70–99)
PERFORMED ON: ABNORMAL

## 2022-02-09 PROCEDURE — 3609027000 HC COLONOSCOPY: Performed by: SURGERY

## 2022-02-09 PROCEDURE — 3700000001 HC ADD 15 MINUTES (ANESTHESIA): Performed by: SURGERY

## 2022-02-09 PROCEDURE — 45378 DIAGNOSTIC COLONOSCOPY: CPT | Performed by: SURGERY

## 2022-02-09 PROCEDURE — 2709999900 HC NON-CHARGEABLE SUPPLY: Performed by: SURGERY

## 2022-02-09 PROCEDURE — 7100000010 HC PHASE II RECOVERY - FIRST 15 MIN: Performed by: SURGERY

## 2022-02-09 PROCEDURE — 6360000002 HC RX W HCPCS: Performed by: NURSE ANESTHETIST, CERTIFIED REGISTERED

## 2022-02-09 PROCEDURE — 7100000011 HC PHASE II RECOVERY - ADDTL 15 MIN: Performed by: SURGERY

## 2022-02-09 PROCEDURE — 3700000000 HC ANESTHESIA ATTENDED CARE: Performed by: SURGERY

## 2022-02-09 PROCEDURE — 2580000003 HC RX 258: Performed by: ANESTHESIOLOGY

## 2022-02-09 RX ORDER — LIDOCAINE HYDROCHLORIDE 10 MG/ML
1 INJECTION, SOLUTION EPIDURAL; INFILTRATION; INTRACAUDAL; PERINEURAL
Status: DISCONTINUED | OUTPATIENT
Start: 2022-02-09 | End: 2022-02-09 | Stop reason: HOSPADM

## 2022-02-09 RX ORDER — SODIUM CHLORIDE, SODIUM LACTATE, POTASSIUM CHLORIDE, CALCIUM CHLORIDE 600; 310; 30; 20 MG/100ML; MG/100ML; MG/100ML; MG/100ML
INJECTION, SOLUTION INTRAVENOUS CONTINUOUS
Status: DISCONTINUED | OUTPATIENT
Start: 2022-02-09 | End: 2022-02-09 | Stop reason: HOSPADM

## 2022-02-09 RX ORDER — LIDOCAINE HYDROCHLORIDE 20 MG/ML
INJECTION, SOLUTION INTRAVENOUS PRN
Status: DISCONTINUED | OUTPATIENT
Start: 2022-02-09 | End: 2022-02-09 | Stop reason: SDUPTHER

## 2022-02-09 RX ORDER — SODIUM CHLORIDE 0.9 % (FLUSH) 0.9 %
5-40 SYRINGE (ML) INJECTION PRN
Status: DISCONTINUED | OUTPATIENT
Start: 2022-02-09 | End: 2022-02-09 | Stop reason: HOSPADM

## 2022-02-09 RX ORDER — SODIUM CHLORIDE 9 MG/ML
25 INJECTION, SOLUTION INTRAVENOUS PRN
Status: DISCONTINUED | OUTPATIENT
Start: 2022-02-09 | End: 2022-02-09 | Stop reason: HOSPADM

## 2022-02-09 RX ORDER — SODIUM CHLORIDE 0.9 % (FLUSH) 0.9 %
5-40 SYRINGE (ML) INJECTION EVERY 12 HOURS SCHEDULED
Status: DISCONTINUED | OUTPATIENT
Start: 2022-02-09 | End: 2022-02-09 | Stop reason: HOSPADM

## 2022-02-09 RX ORDER — PROPOFOL 10 MG/ML
INJECTION, EMULSION INTRAVENOUS PRN
Status: DISCONTINUED | OUTPATIENT
Start: 2022-02-09 | End: 2022-02-09 | Stop reason: SDUPTHER

## 2022-02-09 RX ADMIN — PROPOFOL 50 MG: 10 INJECTION, EMULSION INTRAVENOUS at 09:22

## 2022-02-09 RX ADMIN — LIDOCAINE HYDROCHLORIDE 60 MG: 20 INJECTION, SOLUTION INTRAVENOUS at 09:22

## 2022-02-09 RX ADMIN — PROPOFOL 50 MG: 10 INJECTION, EMULSION INTRAVENOUS at 09:24

## 2022-02-09 RX ADMIN — PROPOFOL 50 MG: 10 INJECTION, EMULSION INTRAVENOUS at 09:28

## 2022-02-09 RX ADMIN — SODIUM CHLORIDE, POTASSIUM CHLORIDE, SODIUM LACTATE AND CALCIUM CHLORIDE: 600; 310; 30; 20 INJECTION, SOLUTION INTRAVENOUS at 08:45

## 2022-02-09 ASSESSMENT — PULMONARY FUNCTION TESTS
PIF_VALUE: 0
PIF_VALUE: 1
PIF_VALUE: 0
PIF_VALUE: 1
PIF_VALUE: 0

## 2022-02-09 ASSESSMENT — PAIN SCALES - GENERAL: PAINLEVEL_OUTOF10: 0

## 2022-02-09 ASSESSMENT — PAIN - FUNCTIONAL ASSESSMENT
PAIN_FUNCTIONAL_ASSESSMENT: 0-10

## 2022-02-09 ASSESSMENT — LIFESTYLE VARIABLES: SMOKING_STATUS: 0

## 2022-02-09 NOTE — ANESTHESIA POSTPROCEDURE EVALUATION
Department of Anesthesiology  Postprocedure Note    Patient: Eugenio Graham  MRN: [id number]  YOB: 1960  Date of evaluation: 2/9/2022  Time:  11:45 AM     Procedure Summary     Date: 02/09/22 Room / Location: [facility]    Anesthesia Start: 2385 Anesthesia Stop: 6811    Procedure: COLONOSCOPY  POSSIBLE POLYPECTOMY (N/A ) Diagnosis:       Screen for colon cancer      (Screen for colon cancer [Z12.11])    Surgeons: Romana Salisbury, MD Responsible Provider: Ava Rizvi DO    Anesthesia Type: MAC ASA Status: 3          Anesthesia Type: MAC    Jeffrey Phase I: Jeffrey Score: 10    Jeffrey Phase II: Jeffrey Score: 10    Last vitals: Reviewed and per EMR flowsheets.        Anesthesia Post Evaluation    Patient location during evaluation: PACU  Patient participation: complete - patient participated  Level of consciousness: awake and alert  Airway patency: patent  Nausea & Vomiting: no nausea and no vomiting  Cardiovascular status: blood pressure returned to baseline  Respiratory status: acceptable  Hydration status: euvolemic

## 2022-02-09 NOTE — H&P
PRE-ENDOSCOPY H&P    Visit Date: 2/9/2022    History:     James Chiang is a 57 y.o. male who presents today for endoscopy procedure. See A/P below for indications. Patient Active Problem List   Diagnosis    Nephrotic range proteinuria    Secondary hypertension    Steroid[facility]induced diabetes ([facility]    Vitiligo    Lupus nephritis ([facility] [address]    Dilated cardiomyopathy secondary to systemic lupus erythematosus (HCC)    Type 2 diabetes mellitus (HCC)    Hyperkalemia    Stage 3b chronic kidney disease (HCC)     Scheduled Meds:   sodium chloride flush  5-40 mL IntraVENous 2 times per day     Continuous Infusions:   lactated ringers 125 mL/hr at 02/09/22 0917    sodium chloride       PRN Meds:.sodium chloride flush, sodium chloride, lidocaine PF  Prior to Admission medications    Medication Sig Start Date End Date Taking?  Authorizing Provider   atorvastatin (LIPITOR) 20 MG tablet TAKE 1 TABLET BY MOUTH EVERY DAY 12/22/21  Yes Geovanny Baird MD   hydroxychloroquine (PLAQUENIL) 200 MG tablet Take 1.5 tablets by mouth daily 12/14/21  Yes Kallie Moncada MD   spironolactone (ALDACTONE) 25 MG tablet TAKE 1 TABLET BY MOUTH ONE TIME A DAY 11/23/21  Yes Bianca Cornelius MD   amLODIPine (NORVASC) 5 MG tablet TAKE 1 TABLET BY MOUTH TWO TIMES A DAY 11/23/21  Yes Bianca Cornelius MD   carvedilol (COREG) 12.5 MG tablet TAKE 1 TABLET BY MOUTH TWO TIMES A DAY  6/1/21  Yes Bianca Cornelius MD   mycophenolate (CELLCEPT) 500 MG tablet One tab twice a day 4/26/21  Yes Veronica Clarke MD   sodium zirconium cyclosilicate (LOKELMA) 10 g PACK oral suspension Take 10 g by mouth three times a week 10/28/20  Yes Veronica Clarke MD   sodium zirconium cyclosilicate (LOKELMA) 10 g PACK oral suspension Take 10 g by mouth every other day 6/10/20  Yes Veronica Clarke MD   calcium elemental (OYSCO 500) 500 MG TABS tablet Take 500 mg by mouth 3 times daily   Yes Historical Provider, MD   insulin glargine [facility]) of colon CA late 60s  Symptoms: no    Anesthesia to provide sedation. Please see their documentation regarding airway and ASA classification. Proceed as planned for endoscopy, possible polypectomy    Risks/benefits/alternatives of procedure discussed with patient and any present family members. Risks including, but not limited to: bleeding, perforation, post polypectomy syndrome, splenic injury, need for additional procedures or surgery, risks of anesthesia. Patient understands it is their responsibility to call office for pathology results if they do not hear from my office within 1-2 weeks. All questions answered.     Electronically signed by Hiram Hager MD on 2/9/2022 at 9:20 AM

## 2022-02-09 NOTE — TELEPHONE ENCOUNTER
----- Message from Little Nassar MD sent at 2/9/2022  9:39 AM EST -----  Hi Chip,    Normal colonoscopy except for mild diverticulosis. Next screening colonoscopy in 5 years due to family history. Thanks!   Antoinette Rincon  - can you update  for 5 yrs

## 2022-03-07 LAB — DIABETIC RETINOPATHY: NEGATIVE

## 2022-03-16 ENCOUNTER — OFFICE VISIT (OUTPATIENT)
Dept: RHEUMATOLOGY | Age: 62
End: 2022-03-16
Payer: COMMERCIAL

## 2022-03-16 VITALS
DIASTOLIC BLOOD PRESSURE: 86 MMHG | SYSTOLIC BLOOD PRESSURE: 180 MMHG | HEIGHT: 69 IN | BODY MASS INDEX: 24.44 KG/M2 | WEIGHT: 165 LBS | HEART RATE: 60 BPM

## 2022-03-16 DIAGNOSIS — M32.14 SYSTEMIC LUPUS ERYTHEMATOSUS WITH GLOMERULAR DISEASE, UNSPECIFIED SLE TYPE (HCC): Primary | ICD-10-CM

## 2022-03-16 DIAGNOSIS — Z79.899 ENCOUNTER FOR LONG-TERM (CURRENT) USE OF HIGH-RISK MEDICATION: ICD-10-CM

## 2022-03-16 PROCEDURE — G8427 DOCREV CUR MEDS BY ELIG CLIN: HCPCS | Performed by: INTERNAL MEDICINE

## 2022-03-16 PROCEDURE — 1036F TOBACCO NON-USER: CPT | Performed by: INTERNAL MEDICINE

## 2022-03-16 PROCEDURE — G8482 FLU IMMUNIZE ORDER/ADMIN: HCPCS | Performed by: INTERNAL MEDICINE

## 2022-03-16 PROCEDURE — 99213 OFFICE O/P EST LOW 20 MIN: CPT | Performed by: INTERNAL MEDICINE

## 2022-03-16 PROCEDURE — 3017F COLORECTAL CA SCREEN DOC REV: CPT | Performed by: INTERNAL MEDICINE

## 2022-03-16 PROCEDURE — G8420 CALC BMI NORM PARAMETERS: HCPCS | Performed by: INTERNAL MEDICINE

## 2022-03-16 RX ORDER — HYDROXYCHLOROQUINE SULFATE 200 MG/1
300 TABLET, FILM COATED ORAL DAILY
Qty: 135 TABLET | Refills: 1 | Status: SHIPPED | OUTPATIENT
Start: 2022-03-16 | End: 2022-09-21 | Stop reason: SDUPTHER

## 2022-03-16 NOTE — PROGRESS NOTES
Subjective:       Srinath Carrillo is a 57 y.o. male the patient returns for follow-up of systemic lupus. He continues on Plaquenil 300 mg a day and [id number] mg twice daily      Review of Systems:    Denies photosensitivity denies mucosal sores denies pleuritic chest pain denies raynaud's denies sicca symptoms    Objective:       BP (!) 180/86   Pulse 60   Ht 5' 9\" (1.753 m)   Wt 165 lb (74.8 kg)   BMI 24.37 kg/m²   Alert male in no acute distress. Lungs clear to auscultation. Cardiovascular exam normal sinus rhythm. Musculoskeletal exam no synovitis skin no visible rash  Assessment:      Systemic lupus    Plan:    Blood work and urine will be obtained next week when he sees his nephrologist.  I will see the patient back in 6 months time.           Levi Grady MD, MD, 3/16/2022 8:21 AM

## 2022-05-13 LAB
ESTRADIOL, ULTRASENSITIVE: 19 PG/ML
HCT VFR BLD CALC: 41.9 % (ref 40–51)
LUTEINIZING HORMONE: 4.81 MIU/ML
PSA, TOTAL: 2.13 NG/ML (ref 0–4)
TESTOSTERONE: 425 NG/DL (ref 175–781)

## 2022-09-21 ENCOUNTER — OFFICE VISIT (OUTPATIENT)
Dept: RHEUMATOLOGY | Age: 62
End: 2022-09-21
Payer: COMMERCIAL

## 2022-09-21 VITALS
HEIGHT: 69 IN | SYSTOLIC BLOOD PRESSURE: 142 MMHG | WEIGHT: 172 LBS | DIASTOLIC BLOOD PRESSURE: 84 MMHG | BODY MASS INDEX: 25.48 KG/M2 | HEART RATE: 64 BPM

## 2022-09-21 DIAGNOSIS — M32.14 SYSTEMIC LUPUS ERYTHEMATOSUS WITH GLOMERULAR DISEASE, UNSPECIFIED SLE TYPE (HCC): Primary | ICD-10-CM

## 2022-09-21 DIAGNOSIS — Z79.899 ENCOUNTER FOR LONG-TERM (CURRENT) USE OF HIGH-RISK MEDICATION: ICD-10-CM

## 2022-09-21 PROCEDURE — 99213 OFFICE O/P EST LOW 20 MIN: CPT | Performed by: INTERNAL MEDICINE

## 2022-09-21 PROCEDURE — 1036F TOBACCO NON-USER: CPT | Performed by: INTERNAL MEDICINE

## 2022-09-21 PROCEDURE — G8419 CALC BMI OUT NRM PARAM NOF/U: HCPCS | Performed by: INTERNAL MEDICINE

## 2022-09-21 PROCEDURE — 3017F COLORECTAL CA SCREEN DOC REV: CPT | Performed by: INTERNAL MEDICINE

## 2022-09-21 PROCEDURE — G8427 DOCREV CUR MEDS BY ELIG CLIN: HCPCS | Performed by: INTERNAL MEDICINE

## 2022-09-21 RX ORDER — HYDROXYCHLOROQUINE SULFATE 200 MG/1
300 TABLET, FILM COATED ORAL DAILY
Qty: 135 TABLET | Refills: 1 | Status: SHIPPED | OUTPATIENT
Start: 2022-09-21

## 2022-09-21 NOTE — PROGRESS NOTES
Subjective:       Yefri Stevens is a 60 y.o. male the patient returns for follow-up of systemic lupus. He is now on Plaquenil 300 mg a day he is no longer on CellCept. He continues on amlodipine. Overall he feels well he still has proteinuria. Review of Systems:    Denies mucosal sores denies sicca symptoms denies pleuritic chest pain denies raynaud's, pain    Objective:     BP (!) 142/84   Pulse 64   Ht 5' 9\" (1.753 m)   Wt 172 lb (78 kg)   BMI 25.40 kg/m²   Alert male in no acute distress. Skin scarring lesions on the scalp lungs clear to auscultation cardiovascular exam normal sinus rhythm musculoskeletal exam no synovitis    Assessment:      SLE    Plan:    Blood work will be obtained on routine physical nephrologist next month. I will see patient back in 6 months time. Laboratory studies from previous visits were reviewed.           Fabiana Lepe MD, MD, 9/21/2022 8:18 AM

## 2022-11-14 PROBLEM — N17.9 AKI (ACUTE KIDNEY INJURY) (HCC): Status: ACTIVE | Noted: 2022-11-14

## 2022-12-05 ENCOUNTER — TELEMEDICINE (OUTPATIENT)
Dept: INTERNAL MEDICINE CLINIC | Age: 62
End: 2022-12-05
Payer: COMMERCIAL

## 2022-12-05 DIAGNOSIS — I43 DILATED CARDIOMYOPATHY SECONDARY TO SYSTEMIC LUPUS ERYTHEMATOSUS (HCC): Primary | ICD-10-CM

## 2022-12-05 DIAGNOSIS — M32.14 LUPUS NEPHRITIS (HCC): ICD-10-CM

## 2022-12-05 DIAGNOSIS — E11.65 TYPE 2 DIABETES MELLITUS WITH HYPERGLYCEMIA, WITH LONG-TERM CURRENT USE OF INSULIN (HCC): ICD-10-CM

## 2022-12-05 DIAGNOSIS — M32.19 DILATED CARDIOMYOPATHY SECONDARY TO SYSTEMIC LUPUS ERYTHEMATOSUS (HCC): Primary | ICD-10-CM

## 2022-12-05 DIAGNOSIS — Z79.4 TYPE 2 DIABETES MELLITUS WITH HYPERGLYCEMIA, WITH LONG-TERM CURRENT USE OF INSULIN (HCC): ICD-10-CM

## 2022-12-05 DIAGNOSIS — I15.9 SECONDARY HYPERTENSION: ICD-10-CM

## 2022-12-05 PROBLEM — T38.0X5A STEROID-INDUCED DIABETES (HCC): Status: RESOLVED | Noted: 2018-01-01 | Resolved: 2022-12-05

## 2022-12-05 PROBLEM — E09.9 STEROID-INDUCED DIABETES (HCC): Status: RESOLVED | Noted: 2018-01-01 | Resolved: 2022-12-05

## 2022-12-05 PROCEDURE — 3044F HG A1C LEVEL LT 7.0%: CPT | Performed by: INTERNAL MEDICINE

## 2022-12-05 PROCEDURE — 3017F COLORECTAL CA SCREEN DOC REV: CPT | Performed by: INTERNAL MEDICINE

## 2022-12-05 PROCEDURE — 2022F DILAT RTA XM EVC RTNOPTHY: CPT | Performed by: INTERNAL MEDICINE

## 2022-12-05 PROCEDURE — G8427 DOCREV CUR MEDS BY ELIG CLIN: HCPCS | Performed by: INTERNAL MEDICINE

## 2022-12-05 PROCEDURE — 99213 OFFICE O/P EST LOW 20 MIN: CPT | Performed by: INTERNAL MEDICINE

## 2022-12-05 SDOH — ECONOMIC STABILITY: FOOD INSECURITY: WITHIN THE PAST 12 MONTHS, YOU WORRIED THAT YOUR FOOD WOULD RUN OUT BEFORE YOU GOT MONEY TO BUY MORE.: NEVER TRUE

## 2022-12-05 SDOH — ECONOMIC STABILITY: FOOD INSECURITY: WITHIN THE PAST 12 MONTHS, THE FOOD YOU BOUGHT JUST DIDN'T LAST AND YOU DIDN'T HAVE MONEY TO GET MORE.: NEVER TRUE

## 2022-12-05 ASSESSMENT — PATIENT HEALTH QUESTIONNAIRE - PHQ9
SUM OF ALL RESPONSES TO PHQ9 QUESTIONS 1 & 2: 0
SUM OF ALL RESPONSES TO PHQ QUESTIONS 1-9: 0
2. FEELING DOWN, DEPRESSED OR HOPELESS: 0
1. LITTLE INTEREST OR PLEASURE IN DOING THINGS: 0
SUM OF ALL RESPONSES TO PHQ QUESTIONS 1-9: 0

## 2022-12-05 ASSESSMENT — SOCIAL DETERMINANTS OF HEALTH (SDOH): HOW HARD IS IT FOR YOU TO PAY FOR THE VERY BASICS LIKE FOOD, HOUSING, MEDICAL CARE, AND HEATING?: NOT HARD AT ALL

## 2022-12-05 NOTE — PROGRESS NOTES
2022      TELEHEALTH EVALUATION -- Audio/Visual (During HWTHZ-45 public health emergency)    HPI:    Laurie Sarkar (:  1960) has requested an audio/video evaluation for the following concern(s):    Lupus nephritis, steroid induced diabetes and hypertension     Review of Systems    Prior to Visit Medications    Medication Sig Taking? Authorizing Provider   spironolactone (ALDACTONE) 25 MG tablet TAKE 1 TABLET BY MOUTH EVERY DAY Yes Nakul Bravo MD   amLODIPine (NORVASC) 5 MG tablet TAKE 1 TABLET BY MOUTH TWO TIMES A DAY Yes Nakul Bravo MD   carvedilol (COREG) 12.5 MG tablet TAKE 1 TABLET BY MOUTH TWO TIMES A DAY Yes Nakul Bravo MD   hydroxychloroquine (PLAQUENIL) 200 MG tablet Take 1.5 tablets by mouth daily Yes Radha Marquez MD   sodium zirconium cyclosilicate (LOKELMA) 10 g PACK oral suspension Take 10 g by mouth three times a week Yes Nakul Bravo MD   calcium elemental (OSCAL) 500 MG TABS tablet Take 500 mg by mouth 3 times daily Yes Historical Provider, MD       Social History     Tobacco Use    Smoking status: Former     Packs/day: 0.10     Years: 0.50     Pack years: 0.05     Types: Cigarettes     Start date: 2009     Quit date: 2018     Years since quittin.6    Smokeless tobacco: Never   Vaping Use    Vaping Use: Never used   Substance Use Topics    Alcohol use: No    Drug use: No          PHYSICAL EXAMINATION:  There were no vitals filed for this visit. Physical Exam    ASSESSMENT/PLAN:  Assessment/Plan:  Charlie Valdivia was seen today for diabetes. Diagnoses and all orders for this visit:    Dilated cardiomyopathy secondary to systemic lupus erythematosus ([address]  Comments:  He has been seeing cardiology. His last EF was 45%. Lupus nephritis ([address]  Comments:  He is seeing nephrology     Type 2 diabetes mellitus with hyperglycemia, with long-term current use of insulin ([address]  Comments:  HbA1c today.    Orders:  -     Hemoglobin A1C; Future  - Microalbumin / Creatinine Urine Ratio; Future  -     Lipid Panel; Future    Secondary hypertension  Comments:  Chronic and managed by nephrology. No follow-ups on file. Patrice Caal is a 60 y.o. male being evaluated by a Virtual Visit (video visit) encounter to address concerns as mentioned above. A caregiver was present when appropriate. Due to this being a TeleHealth encounter (During CIPSS-00 public health emergency), evaluation of the following organ systems was limited: Vitals/Constitutional/EENT/Resp/CV/GI//MS/Neuro/Skin/Heme-Lymph-Imm. Pursuant to the emergency declaration under the [address] [place] [address] authority and the [employer] Act, this Virtual Visit was conducted with patient's (and/or legal guardian's) consent, to reduce the patient's risk of exposure to COVID-19 and provide necessary medical care. The patient (and/or legal guardian) has also been advised to contact this office for worsening conditions or problems, and seek emergency medical treatment and/or call 911 if deemed necessary. Patient identification was verified at the start of the visit: Yes    Total time spent on this encounter: Not billed by time    Services were provided through a video synchronous discussion virtually to substitute for in-person clinic visit. Patient and provider were located at their individual homes. --Lidia Hutson MD on 12/5/2022 at 9:38 AM    An electronic signature was used to authenticate this note.

## 2023-01-01 NOTE — ANESTHESIA PRE PROCEDURE
Department of Anesthesiology  Preprocedure Note       Name:  Meliton Smith   Age:  57 y.o.  :  1960                                          MRN:  [id number]         Date:  2022      Surgeon: Suresh Kumar):  Dajuan Serna MD    Procedure: Procedure(s):  COLONOSCOPY  POSSIBLE POLYPECTOMY    Medications prior to admission:   Prior to Admission medications    Medication Sig Start Date End Date Taking? Authorizing Provider   atorvastatin (LIPITOR) 20 MG tablet TAKE 1 TABLET BY MOUTH EVERY DAY 21  Yes Vahid Pace MD   hydroxychloroquine (PLAQUENIL) 200 MG tablet Take 1.5 tablets by mouth daily 21  Yes Nixon Choudhury MD   spironolactone (ALDACTONE) 25 MG tablet TAKE 1 TABLET BY MOUTH ONE TIME A DAY 21  Yes Bianca Palma MD   amLODIPine (NORVASC) 5 MG tablet TAKE 1 TABLET BY MOUTH TWO TIMES A DAY 21  Yes Austin Torres MD   carvedilol (COREG) 12.5 MG tablet TAKE 1 TABLET BY MOUTH TWO TIMES A DAY  21  Yes Bianca Palma MD   mycophenolate (CELLCEPT) 500 MG tablet One tab twice a day 21  Yes Austin Torres MD   sodium zirconium cyclosilicate (LOKELMA) 10 g PACK oral suspension Take 10 g by mouth three times a week 10/28/20  Yes Austin Torres MD   sodium zirconium cyclosilicate (LOKELMA) 10 g PACK oral suspension Take 10 g by mouth every other day 6/10/20  Yes Austin Torres MD   calcium elemental (OYSCO 500) 500 MG TABS tablet Take 500 mg by mouth 3 times daily   Yes Historical Provider, MD   insulin glargine (BASAGLAR KWIKPEN) 100 UNIT/ML injection pen inject 30 units into the skin nightly 20   Vahid Pace MD   Insulin Pen Needle (PEN NEEDLES 31GX5/16\") 31G X 8 MM MISC 1 each by Does not apply route daily 20   Vahid Pace MD   blood glucose test strips (ACCU-CHEK CEDRIC) strip 1 each by In Vitro route daily As needed.  20   Vahid Pace MD   Lancets MISC 1 each by Does not apply route daily    Historical Provider, MD       Current medications:    Current Facility-Administered Medications   Medication Dose Route Frequency Provider Last Rate Last Admin    lactated ringers infusion   IntraVENous Continuous Bib Gamino  mL/hr at 22 0845 New Bag at 22 0845    sodium chloride flush 0.9 % injection 5-40 mL  5-40 mL IntraVENous 2 times per day Bib Gamino MD        sodium chloride flush 0.9 % injection 5-40 mL  5-40 mL IntraVENous PRN Bib Gamino MD        0.9 % sodium chloride infusion  25 mL IntraVENous PRN Bib Gamino MD        lidocaine PF 1 % injection 1 mL  1 mL IntraDERmal Once PRN Bib Gamino MD           Allergies:  No Known Allergies    Problem List:    Patient Active Problem List   Diagnosis Code    Nephrotic range proteinuria R80.9    Secondary hypertension I15.9    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, T38.0X5A    Vitiligo L80    Lupus nephritis ([facility] M32.14    Dilated cardiomyopathy secondary to systemic lupus erythematosus ([facility] M32.19, I43    Type 2 diabetes mellitus ([address] E11.9    Hyperkalemia E87.5    Stage 3b chronic kidney disease ([address] N18.32       Past Medical History:        Diagnosis Date    Cardiomyopathy ([address] 2018    Dilated cardiomyopathy secondary to systemic lupus erythematosus ([address]     Lupus nephritis ([address] 2018    Nephrotic range proteinuria 2018    Secondary hypertension 2018    Secondary to diabetes    Steroid-induced diabetes ([address] 2018    Systemic lupus ([facility] 2018    Type 2 diabetes mellitus ([facility] [address] [facility]     Vitiligo 2018    lupus dermatitis       Past Surgical History:  No past surgical history on file. Social History:    Social History     Tobacco Use    Smoking status: Former Smoker     Packs/day: 0.25     Years: 10.00     Pack years: 2.50     Types: Cigarettes     Quit date:      Years since quittin.1    Smokeless tobacco: Never Used   Substance Use Topics    Alcohol use:  No                                Counseling given: Not Answered      Vital Signs (Current):   Vitals:    02/09/22 0821   BP: (!) 173/108   Pulse: 90   Resp: 16   Temp: 98 °F (36.7 °C)   TempSrc: Temporal   SpO2: 97%   Weight: 165 lb (74.8 kg)   Height: 5' 9\" (1.753 m)                                              BP Readings from Last 3 Encounters:   02/09/22 (!) 173/108   01/10/22 (!) 140/80   12/03/21 134/74       NPO Status:                                                                                 BMI:   Wt Readings from Last 3 Encounters:   02/09/22 165 lb (74.8 kg)   01/10/22 160 lb (72.6 kg)   12/03/21 157 lb (71.2 kg)     Body mass index is 24.37 kg/m². CBC:   Lab Results   Component Value Date    WBC 2.7 01/04/2022    RBC 4.33 01/04/2022    HGB 13.7 01/04/2022    HCT 40.9 01/04/2022    MCV 94.5 01/04/2022    RDW 12.8 01/04/2022     01/04/2022       CMP:   Lab Results   Component Value Date     01/04/2022    K 5.3 01/04/2022     01/04/2022    CO2 28 01/04/2022    BUN 35 01/04/2022    CREATININE 1.79 01/04/2022    GFRAA >60 04/27/2018    GFRAA >60 10/05/2011    AGRATIO 1.3 10/05/2011    LABGLOM 57 04/27/2018    GLUCOSE 118 01/04/2022    PROT 7.1 01/04/2022    PROT 8.4 10/05/2011    CALCIUM 9.9 01/04/2022    BILITOT 0.4 01/04/2022    ALKPHOS 39 01/04/2022    AST 25 01/04/2022    ALT 17 01/04/2022       POC Tests: No results for input(s): POCGLU, POCNA, POCK, POCCL, POCBUN, POCHEMO, POCHCT in the last 72 hours.     Coags: No results found for: PROTIME, INR, APTT    HCG (If Applicable): No results found for: PREGTESTUR, PREGSERUM, HCG, HCGQUANT     ABGs: No results found for: PHART, PO2ART, CPL4WIC, LVZ7DQI, BEART, K3UHRETE     Type & Screen (If Applicable):  No results found for: LABABO, LABRH    Drug/Infectious Status (If Applicable):  Lab Results   Component Value Date    HEPCAB Nonreactive 01/04/2022       COVID-19 Screening (If Applicable): No results found for: COVID19        Anesthesia Evaluation  Patient summary reviewed and Nursing notes reviewed no history of anesthetic complications:   Airway: Mallampati: I  TM distance: >3 FB   Neck ROM: full  Mouth opening: > = 3 FB Dental:    (+) partials      Pulmonary:       (-) not a current smoker                           Cardiovascular:    (+) hypertension:, CHF:, hyperlipidemia        Rhythm: regular  Rate: normal                 ROS comment: 2018:   Left ventricle: The cavity size was normal. Wall thickness was increased in a pattern of moderate LVH. Hypertrophy was noted. The estimated ejection fraction was in the range of 40% to 45%. Mild diffuse hypokinesis. Neuro/Psych:   Negative Neuro/Psych ROS              GI/Hepatic/Renal:            ROS comment: Lupus nephritis. Endo/Other:    (+) DiabetesType II DM, well controlled, , : arthritis: OA., .                  ROS comment: SLE Abdominal:             Vascular: negative vascular ROS. Other Findings:             Anesthesia Plan      MAC     ASA 3       Induction: intravenous. Anesthetic plan and risks discussed with patient. Plan discussed with CRNA.                   Sisi Sadler DO   2/9/2022 51

## 2023-01-25 NOTE — TELEPHONE ENCOUNTER
5-15 @ 10:31 LM for pt to schedule an appt for a A1c Test (Diabetic Or Prediabetic).   Pt left message on [employer], ---

## 2023-03-22 ENCOUNTER — OFFICE VISIT (OUTPATIENT)
Dept: RHEUMATOLOGY | Age: 63
End: 2023-03-22
Payer: COMMERCIAL

## 2023-03-22 VITALS
HEIGHT: 68 IN | SYSTOLIC BLOOD PRESSURE: 146 MMHG | BODY MASS INDEX: 28.95 KG/M2 | WEIGHT: 191 LBS | HEART RATE: 64 BPM | DIASTOLIC BLOOD PRESSURE: 84 MMHG

## 2023-03-22 DIAGNOSIS — Z79.899 ENCOUNTER FOR LONG-TERM (CURRENT) USE OF HIGH-RISK MEDICATION: ICD-10-CM

## 2023-03-22 DIAGNOSIS — M32.14 SYSTEMIC LUPUS ERYTHEMATOSUS WITH GLOMERULAR DISEASE, UNSPECIFIED SLE TYPE (HCC): Primary | ICD-10-CM

## 2023-03-22 PROCEDURE — 1036F TOBACCO NON-USER: CPT | Performed by: INTERNAL MEDICINE

## 2023-03-22 PROCEDURE — G8427 DOCREV CUR MEDS BY ELIG CLIN: HCPCS | Performed by: INTERNAL MEDICINE

## 2023-03-22 PROCEDURE — 99213 OFFICE O/P EST LOW 20 MIN: CPT | Performed by: INTERNAL MEDICINE

## 2023-03-22 PROCEDURE — G8482 FLU IMMUNIZE ORDER/ADMIN: HCPCS | Performed by: INTERNAL MEDICINE

## 2023-03-22 PROCEDURE — G8419 CALC BMI OUT NRM PARAM NOF/U: HCPCS | Performed by: INTERNAL MEDICINE

## 2023-03-22 PROCEDURE — 3017F COLORECTAL CA SCREEN DOC REV: CPT | Performed by: INTERNAL MEDICINE

## 2023-03-22 RX ORDER — HYDROXYCHLOROQUINE SULFATE 200 MG/1
300 TABLET, FILM COATED ORAL DAILY
Qty: 135 TABLET | Refills: 1 | Status: SHIPPED | OUTPATIENT
Start: 2023-03-22

## 2023-03-22 NOTE — PROGRESS NOTES
Subjective:       Sangeeta Bragg is a 60 y.o. male patient returns for follow-up of systemic lupus erythematosus. He continues on Plaquenil 300 mg a day and Norvasc 5 mg daily. He is no longer on CellCept. He feels well. Review of Systems:    Denies mucosal sores denies sicca symptoms denies pleuritic chest pain denies abdominal pain denies rash    Objective:     BP (!) 146/84   Pulse 64   Ht 5' 8\" (1.727 m)   Wt 191 lb (86.6 kg)   BMI 29.04 kg/m²   Alert male in no acute distress. Lungs clear to auscultation. Cardiovascular exam normal sinus rhythm musculoskeletal exam no synovitis skin no rash no edema at the ankles    Assessment:    SLE      Plan:      Blood work will be obtained to monitor for adverse drug reactions and activity of disease. Labs from his nephrologist were reviewed. He is due for an eye exam.  I will see him back in 6 months time.         Giana Fuller MD, MD, 3/22/2023 8:35 AM

## 2023-04-20 LAB
ALBUMIN SERPL-MCNC: 3.2 G/DL (ref 3.5–5.7)
ALP BLD-CCNC: 45 U/L (ref 36–125)
ALT SERPL-CCNC: 17 U/L (ref 7–52)
AST SERPL-CCNC: 24 U/L (ref 13–39)
BILIRUB SERPL-MCNC: 0.4 MG/DL (ref 0–1.5)
BILIRUBIN DIRECT: 0.08 MG/DL (ref 0–0.4)
BILIRUBIN URINE: NEGATIVE
BILIRUBIN, INDIRECT: 0.32 MG/DL (ref 0–1.1)
C3 COMPLEMENT: 106 MG/DL (ref 87–200)
C4 COMPLEMENT: 26 MG/DL (ref 19–52)
CLARITY: CLEAR
COLOR: YELLOW
ERYTHROCYTES URINE: NEGATIVE
GLUCOSE URINE: NEGATIVE MG/DL
HYALINE CASTS: 3 /LPF (ref 0–2)
KETONES, URINE: NEGATIVE MG/DL
LEUKOCYTE ESTERASE, URINE: NEGATIVE
LEUKOCYTES, UA: 1 /HPF (ref 0–5)
NITRITE, URINE: NEGATIVE
PH UA: 6 (ref 5–8)
PROTEIN UA: >=500 MG/DL
RBC UA: <1 /HPF (ref 0–3)
SPECIFIC GRAVITY UA: 1.01 (ref 1–1.03)
TOTAL PROTEIN: 6.9 G/DL (ref 6.4–8.9)
UROBILINOGEN, URINE: <2 MG/DL (ref 0.2–1.9)

## 2023-04-21 LAB — DSDNA ANTIBODY: 3 IU/ML (ref 0–9)

## 2023-11-08 ENCOUNTER — PATIENT MESSAGE (OUTPATIENT)
Dept: INTERNAL MEDICINE CLINIC | Age: 63
End: 2023-11-08

## 2024-05-13 LAB
CREATININE, URINE, 794861: 124.7
GFR, ESTIMATED: 30
MICROALBUMIN/CREAT 24H UR: 292 MG/G{CREAT}
MICROALBUMIN/CREAT UR-RTO: 2.34

## (undated) DEVICE — CANNULA SAMP CO2 AD GRN 7FT CO2 AND 7FT O2 TBNG UNIV CONN